# Patient Record
Sex: MALE | Race: BLACK OR AFRICAN AMERICAN | Employment: UNEMPLOYED | URBAN - METROPOLITAN AREA
[De-identification: names, ages, dates, MRNs, and addresses within clinical notes are randomized per-mention and may not be internally consistent; named-entity substitution may affect disease eponyms.]

---

## 2020-10-22 ENCOUNTER — HOSPITAL ENCOUNTER (INPATIENT)
Age: 35
LOS: 5 days | Discharge: HOME | DRG: 751 | End: 2020-10-27
Attending: PSYCHIATRY & NEUROLOGY | Admitting: PSYCHIATRY & NEUROLOGY
Payer: COMMERCIAL

## 2020-10-22 ENCOUNTER — HOSPITAL ENCOUNTER (EMERGENCY)
Age: 35
Discharge: OTHER INSTITUTION | End: 2020-10-22
Attending: STUDENT IN AN ORGANIZED HEALTH CARE EDUCATION/TRAINING PROGRAM | Admitting: STUDENT IN AN ORGANIZED HEALTH CARE EDUCATION/TRAINING PROGRAM
Payer: COMMERCIAL

## 2020-10-22 VITALS
DIASTOLIC BLOOD PRESSURE: 99 MMHG | HEIGHT: 72 IN | BODY MASS INDEX: 20.32 KG/M2 | RESPIRATION RATE: 16 BRPM | SYSTOLIC BLOOD PRESSURE: 142 MMHG | HEART RATE: 72 BPM | WEIGHT: 150 LBS | OXYGEN SATURATION: 99 % | TEMPERATURE: 97.9 F

## 2020-10-22 DIAGNOSIS — R45.851 SUICIDAL IDEATION: Primary | ICD-10-CM

## 2020-10-22 PROBLEM — F19.94 SUBSTANCE INDUCED MOOD DISORDER (HCC): Status: ACTIVE | Noted: 2020-10-22

## 2020-10-22 LAB
ALBUMIN SERPL-MCNC: 4.2 G/DL (ref 3.5–5)
ALBUMIN/GLOB SERPL: 1.2 {RATIO} (ref 1.1–2.2)
ALP SERPL-CCNC: 102 U/L (ref 45–117)
ALT SERPL-CCNC: 61 U/L (ref 12–78)
AMPHET UR QL SCN: POSITIVE
ANION GAP SERPL CALC-SCNC: 5 MMOL/L (ref 5–15)
APPEARANCE UR: CLEAR
AST SERPL-CCNC: 24 U/L (ref 15–37)
B PERT DNA SPEC QL NAA+PROBE: NOT DETECTED
BACTERIA URNS QL MICRO: NEGATIVE /HPF
BACTERIA URNS QL MICRO: NEGATIVE /HPF
BARBITURATES UR QL SCN: NEGATIVE
BASOPHILS # BLD: 0.1 K/UL (ref 0–0.1)
BASOPHILS NFR BLD: 1 % (ref 0–1)
BENZODIAZ UR QL: NEGATIVE
BILIRUB SERPL-MCNC: 0.6 MG/DL (ref 0.2–1)
BILIRUB UR QL: NEGATIVE
BORDETELLA PARAPERTUSSIS PCR, BORPAR: NOT DETECTED
BUN SERPL-MCNC: 14 MG/DL (ref 6–20)
BUN/CREAT SERPL: 11 (ref 12–20)
C PNEUM DNA SPEC QL NAA+PROBE: NOT DETECTED
CALCIUM SERPL-MCNC: 9.5 MG/DL (ref 8.5–10.1)
CANNABINOIDS UR QL SCN: NEGATIVE
CHLORIDE SERPL-SCNC: 105 MMOL/L (ref 97–108)
CO2 SERPL-SCNC: 29 MMOL/L (ref 21–32)
COCAINE UR QL SCN: NEGATIVE
COLOR UR: ABNORMAL
COMMENT, HOLDF: NORMAL
COVID-19 RAPID TEST, COVR: NOT DETECTED
CREAT SERPL-MCNC: 1.32 MG/DL (ref 0.7–1.3)
DIFFERENTIAL METHOD BLD: NORMAL
DRUG SCRN COMMENT,DRGCM: ABNORMAL
EOSINOPHIL # BLD: 0.2 K/UL (ref 0–0.4)
EOSINOPHIL NFR BLD: 2 % (ref 0–7)
EPITH CASTS URNS QL MICRO: ABNORMAL /LPF
EPITH CASTS URNS QL MICRO: NORMAL /LPF
ERYTHROCYTE [DISTWIDTH] IN BLOOD BY AUTOMATED COUNT: 12.8 % (ref 11.5–14.5)
ETHANOL SERPL-MCNC: <10 MG/DL
FLUAV H1 2009 PAND RNA SPEC QL NAA+PROBE: NOT DETECTED
FLUAV H1 RNA SPEC QL NAA+PROBE: NOT DETECTED
FLUAV H3 RNA SPEC QL NAA+PROBE: NOT DETECTED
FLUAV SUBTYP SPEC NAA+PROBE: NOT DETECTED
FLUBV RNA SPEC QL NAA+PROBE: NOT DETECTED
GLOBULIN SER CALC-MCNC: 3.6 G/DL (ref 2–4)
GLUCOSE SERPL-MCNC: 81 MG/DL (ref 65–100)
GLUCOSE UR STRIP.AUTO-MCNC: NEGATIVE MG/DL
HADV DNA SPEC QL NAA+PROBE: NOT DETECTED
HCOV 229E RNA SPEC QL NAA+PROBE: NOT DETECTED
HCOV HKU1 RNA SPEC QL NAA+PROBE: NOT DETECTED
HCOV NL63 RNA SPEC QL NAA+PROBE: NOT DETECTED
HCOV OC43 RNA SPEC QL NAA+PROBE: NOT DETECTED
HCT VFR BLD AUTO: 46.6 % (ref 36.6–50.3)
HEALTH STATUS, XMCV2T: NORMAL
HGB BLD-MCNC: 15.3 G/DL (ref 12.1–17)
HGB UR QL STRIP: NEGATIVE
HMPV RNA SPEC QL NAA+PROBE: NOT DETECTED
HPIV1 RNA SPEC QL NAA+PROBE: NOT DETECTED
HPIV2 RNA SPEC QL NAA+PROBE: NOT DETECTED
HPIV3 RNA SPEC QL NAA+PROBE: NOT DETECTED
HPIV4 RNA SPEC QL NAA+PROBE: NOT DETECTED
HYALINE CASTS URNS QL MICRO: ABNORMAL /LPF (ref 0–5)
HYALINE CASTS URNS QL MICRO: NORMAL /LPF (ref 0–5)
IMM GRANULOCYTES # BLD AUTO: 0 K/UL (ref 0–0.04)
IMM GRANULOCYTES NFR BLD AUTO: 0 % (ref 0–0.5)
KETONES UR QL STRIP.AUTO: NEGATIVE MG/DL
LEUKOCYTE ESTERASE UR QL STRIP.AUTO: NEGATIVE
LYMPHOCYTES # BLD: 2.6 K/UL (ref 0.8–3.5)
LYMPHOCYTES NFR BLD: 38 % (ref 12–49)
M PNEUMO DNA SPEC QL NAA+PROBE: NOT DETECTED
MCH RBC QN AUTO: 27.9 PG (ref 26–34)
MCHC RBC AUTO-ENTMCNC: 32.8 G/DL (ref 30–36.5)
MCV RBC AUTO: 85 FL (ref 80–99)
METHADONE UR QL: NEGATIVE
MONOCYTES # BLD: 0.8 K/UL (ref 0–1)
MONOCYTES NFR BLD: 11 % (ref 5–13)
NEUTS SEG # BLD: 3.3 K/UL (ref 1.8–8)
NEUTS SEG NFR BLD: 48 % (ref 32–75)
NITRITE UR QL STRIP.AUTO: NEGATIVE
NRBC # BLD: 0 K/UL (ref 0–0.01)
NRBC BLD-RTO: 0 PER 100 WBC
OPIATES UR QL: NEGATIVE
PCP UR QL: NEGATIVE
PH UR STRIP: 6 [PH] (ref 5–8)
PLATELET # BLD AUTO: 323 K/UL (ref 150–400)
PMV BLD AUTO: 9.2 FL (ref 8.9–12.9)
POTASSIUM SERPL-SCNC: 4.4 MMOL/L (ref 3.5–5.1)
PROT SERPL-MCNC: 7.8 G/DL (ref 6.4–8.2)
PROT UR STRIP-MCNC: 100 MG/DL
RBC # BLD AUTO: 5.48 M/UL (ref 4.1–5.7)
RBC #/AREA URNS HPF: ABNORMAL /HPF (ref 0–5)
RBC #/AREA URNS HPF: NORMAL /HPF (ref 0–5)
RSV RNA SPEC QL NAA+PROBE: NOT DETECTED
RV+EV RNA SPEC QL NAA+PROBE: DETECTED
SAMPLES BEING HELD,HOLD: NORMAL
SODIUM SERPL-SCNC: 139 MMOL/L (ref 136–145)
SOURCE, COVRS: NORMAL
SP GR UR REFRACTOMETRY: 1.03 (ref 1–1.03)
SPECIMEN SOURCE, FCOV2M: NORMAL
SPECIMEN TYPE, XMCV1T: NORMAL
UROBILINOGEN UR QL STRIP.AUTO: 1 EU/DL (ref 0.2–1)
WBC # BLD AUTO: 6.9 K/UL (ref 4.1–11.1)
WBC URNS QL MICRO: ABNORMAL /HPF (ref 0–4)
WBC URNS QL MICRO: NORMAL /HPF (ref 0–4)

## 2020-10-22 PROCEDURE — 99284 EMERGENCY DEPT VISIT MOD MDM: CPT

## 2020-10-22 PROCEDURE — 90791 PSYCH DIAGNOSTIC EVALUATION: CPT

## 2020-10-22 PROCEDURE — 0100U RESPIRATORY PANEL,PCR,NASOPHARYNGEAL: CPT

## 2020-10-22 PROCEDURE — 81001 URINALYSIS AUTO W/SCOPE: CPT

## 2020-10-22 PROCEDURE — 85025 COMPLETE CBC W/AUTO DIFF WBC: CPT

## 2020-10-22 PROCEDURE — 80307 DRUG TEST PRSMV CHEM ANLYZR: CPT

## 2020-10-22 PROCEDURE — 80053 COMPREHEN METABOLIC PANEL: CPT

## 2020-10-22 PROCEDURE — 36415 COLL VENOUS BLD VENIPUNCTURE: CPT

## 2020-10-22 PROCEDURE — 65220000003 HC RM SEMIPRIVATE PSYCH

## 2020-10-22 PROCEDURE — 87635 SARS-COV-2 COVID-19 AMP PRB: CPT

## 2020-10-22 RX ORDER — OLANZAPINE 5 MG/1
5 TABLET ORAL
Status: DISCONTINUED | OUTPATIENT
Start: 2020-10-22 | End: 2020-10-27 | Stop reason: HOSPADM

## 2020-10-22 RX ORDER — LORAZEPAM 2 MG/ML
1 INJECTION INTRAMUSCULAR
Status: DISCONTINUED | OUTPATIENT
Start: 2020-10-22 | End: 2020-10-27 | Stop reason: HOSPADM

## 2020-10-22 RX ORDER — ACETAMINOPHEN 325 MG/1
650 TABLET ORAL
Status: DISCONTINUED | OUTPATIENT
Start: 2020-10-22 | End: 2020-10-27 | Stop reason: HOSPADM

## 2020-10-22 RX ORDER — TRAZODONE HYDROCHLORIDE 50 MG/1
50 TABLET ORAL
Status: DISCONTINUED | OUTPATIENT
Start: 2020-10-22 | End: 2020-10-27 | Stop reason: HOSPADM

## 2020-10-22 RX ORDER — HALOPERIDOL 5 MG/ML
5 INJECTION INTRAMUSCULAR
Status: DISCONTINUED | OUTPATIENT
Start: 2020-10-22 | End: 2020-10-27 | Stop reason: HOSPADM

## 2020-10-22 RX ORDER — GUAIFENESIN 100 MG/5ML
100 SOLUTION ORAL
Status: DISCONTINUED | OUTPATIENT
Start: 2020-10-22 | End: 2020-10-27 | Stop reason: HOSPADM

## 2020-10-22 RX ORDER — DM/P-EPHED/ACETAMINOPH/DOXYLAM 30-7.5/3
2 LIQUID (ML) ORAL
Status: DISCONTINUED | OUTPATIENT
Start: 2020-10-22 | End: 2020-10-27 | Stop reason: HOSPADM

## 2020-10-22 RX ORDER — ADHESIVE BANDAGE
30 BANDAGE TOPICAL DAILY PRN
Status: DISCONTINUED | OUTPATIENT
Start: 2020-10-22 | End: 2020-10-27 | Stop reason: HOSPADM

## 2020-10-22 RX ORDER — BENZTROPINE MESYLATE 1 MG/1
1 TABLET ORAL
Status: DISCONTINUED | OUTPATIENT
Start: 2020-10-22 | End: 2020-10-27 | Stop reason: HOSPADM

## 2020-10-22 RX ORDER — HYDROXYZINE 50 MG/1
50 TABLET, FILM COATED ORAL
Status: DISCONTINUED | OUTPATIENT
Start: 2020-10-22 | End: 2020-10-27 | Stop reason: HOSPADM

## 2020-10-22 RX ORDER — DIPHENHYDRAMINE HYDROCHLORIDE 50 MG/ML
50 INJECTION, SOLUTION INTRAMUSCULAR; INTRAVENOUS
Status: DISCONTINUED | OUTPATIENT
Start: 2020-10-22 | End: 2020-10-27 | Stop reason: HOSPADM

## 2020-10-22 NOTE — ED NOTES
[facility] arrived to transport patient to [address] [place] [address] Rd; pt stable at time of transfer, all patient belongings sent with [facility]; EMTALA, PCS, facesheet and ED summary given to [facility].

## 2020-10-22 NOTE — ED TRIAGE NOTES
Pt here feelings of depression. States he is \"going through a lot right now\". Pt denies SI. Unable to give timeline of when feelings started. Pt states \"I'm just not thinking right\". Pt also states, \"there is something going on with my skin. I don't know, lice maybe? It's not going on right now but I wanted to get that checked out\". Denies rash.

## 2020-10-22 NOTE — ED NOTES
For patient safety, pt changed into green gown. All personal belongings put into belongings bag. Room checked for safety. Pt's still low risk for SI screening. MD aware and feels pt does not need a sitter at this time. Pt continues to be cooperative.

## 2020-10-22 NOTE — ED NOTES
Pt admitted to having a knife in his pocket. Pt offered to give his knife to his mother to take to the car. Pt is being upfront and cooperative. MD and  made aware. Pt denies thoughts or plans of SI/HI at this time. Family member removed weapon from the facility.

## 2020-10-22 NOTE — ED PROVIDER NOTES
The patient is a 29-year-old male history of substance abuse presenting to the emergency department today for behavioral health assessment. Patient tells me that he has been feeling down for the past 1 year or so. He states that [place] lot of things have happened\" recently and has had worsening depression without suicidal or homicidal thoughts. He reports using drugs including methamphetamines, states that he last used 2 to 3 weeks ago however his mom told nurse that he used 2 days ago. Patient denies having any psychiatric diagnoses nor has he ever been admitted for this. He states he recently got in trouble with the law and this is also exacerbated things. Patient's mother told RN that he has expressed suicidal thoughts at home and was recently running a knife up and down his arm. She reports that she does not feel safe with him at home as he punches walls although he is never hurt her. A knife was found on the patient by RN when undressing him. Patient also tells me that he thinks he has worms and/or parasites coming out of his skin. This has been going on for several weeks. He states that he has itching and he will pour peroxide on his skin and see small bugs coming out of his skin. States he also has been scrubbing his skin constantly and shaved all the hair off of his body. Social History     Socioeconomic History    Marital status: SINGLE     Spouse name: Not on file    Number of children: Not on file    Years of education: Not on file    Highest education level: Not on file   Occupational History    Not on file   Social Needs    Financial resource strain: Not on file    Food insecurity     Worry: Not on file     Inability: Not on file    Transportation needs     Medical: Not on file     Non-medical: Not on file   Tobacco Use    Smoking status: Current Every Day Smoker   Substance and Sexual Activity    Alcohol use:  Yes    Drug use: No    Sexual activity: Not on file Lifestyle    Physical activity     Days per week: Not on file     Minutes per session: Not on file    Stress: Not on file   Relationships    Social connections     Talks on phone: Not on file     Gets together: Not on file     Attends Gnosticism service: Not on file     Active member of club or organization: Not on file     Attends meetings of clubs or organizations: Not on file     Relationship status: Not on file    Intimate partner violence     Fear of current or ex partner: Not on file     Emotionally abused: Not on file     Physically abused: Not on file     Forced sexual activity: Not on file   Other Topics Concern    Not on file   Social History Narrative    Not on file         ALLERGIES: Patient has no known allergies. Review of Systems   Constitutional: Negative for chills and fever. HENT: Negative for congestion and sore throat. Eyes: Negative for pain and redness. Respiratory: Negative for cough and shortness of breath. Cardiovascular: Negative for chest pain and palpitations. Gastrointestinal: Negative for abdominal pain, diarrhea, nausea and vomiting. Genitourinary: Negative for frequency and hematuria. Musculoskeletal: Negative for back pain and neck pain. Skin: Negative for rash and wound. Neurological: Negative for dizziness and headaches. Hematological: Does not bruise/bleed easily. Psychiatric/Behavioral: Positive for dysphoric mood. Vitals:    10/22/20 1152   BP: 119/79   Pulse: (!) 105   Resp: 20   Temp: 97.8 °F (36.6 °C)   SpO2: 98%   Weight: 68 kg (150 lb)   Height: 6' (1.829 m)            Physical Exam  Vitals signs and nursing note reviewed. Constitutional:       General: He is not in acute distress. Appearance: He is well-developed. HENT:      Head: Normocephalic and atraumatic. Eyes:      Conjunctiva/sclera: Conjunctivae normal.      Pupils: Pupils are equal, round, and reactive to light.    Neck:      Musculoskeletal: Normal range of motion and neck supple. Cardiovascular:      Rate and Rhythm: Normal rate and regular rhythm. Heart sounds: Normal heart sounds. No murmur. No friction rub. No gallop. Pulmonary:      Effort: Pulmonary effort is normal. No respiratory distress. Breath sounds: Normal breath sounds. No wheezing or rales. Abdominal:      General: Bowel sounds are normal. There is no distension. Palpations: Abdomen is soft. Tenderness: There is no abdominal tenderness. There is no guarding or rebound. Musculoskeletal: Normal range of motion. Skin:     General: Skin is warm and dry. Capillary Refill: Capillary refill takes less than 2 seconds. Findings: No rash. Comments: No visible rash or infestation on his skin   Neurological:      Mental Status: He is alert and oriented to person, place, and time. Psychiatric:      Comments: Cooperative with flat affect  Denies active suicidal or homicidal ideation  Does not appear internally stimulated at this time        Labs Reviewed:   UA negative  UDS positive for amphetamines  No leukocytosis or anemia  Normal renal function and electrolytes  Covid negative    Imaging Reviewed: N/A      Course:   [facility] saw the patient. Plan is to admit. Patient agreeable to this. He is medically cleared        MDM:  78-year-old male with no diagnosed psychiatric history presenting today with depression and suicidal ideation in the setting of recent drug use. He also is concerned that he has worms coming from his skin, which I do not appreciate, is unclear if this is a hallucination or not. Patient is medically cleared and will be admitted to [facility] psychiatric inpatient unit. Clinical Impression:     ICD-10-CM ICD-9-CM    1. Suicidal ideation  R45. 1 V62.84            Disposition: [address] [place] [address],

## 2020-10-22 NOTE — BH NOTES
Pt arrived at [facility] via [facility] on stretcher  Dual skin assesment was performed by Vivek Garcia, [facility] [address] [facility]. Assessment was unremarkable, tattoos on right arm . Nolberto score is 23  Pt did consent to safety while on the unit   Did sign consent forms up on arriving   Denies suicidal ideation   Denies homicidal ideation   Does not appear to be responding to internal stimuli   Did insert plastic into ear and told staff \"he needed de-wormer because worms have been coming our of his ear\"   UDS was positive for amphetamines    Pt was calm and cooporative during admission process.  Will continue to monitor and support q15min

## 2020-10-22 NOTE — BSMART NOTE
This note will not be viewable in MyChart for the following reason(s). Likely risk of substantial harm from patient finding out mother disclosed information that he was not forthcoming about, including that she is afraid of him. Comprehensive Assessment Form Part 1 Section I - Disposition Axis I - Substance Induced Psychotic Disorder, Unspecified Depression Axis II - Deferred Axis III - None Axis IV - Relationship stressors, Legal stressors Ashley V - 36 The Medical Doctor to Psychiatrist conference was not completed. The Medical Doctor is in agreement with Psychiatrist disposition because of (reason) patient is willing to be admitted voluntarily. The plan is admit to [facility] acute [facility]. The on-call Psychiatrist consulted was DEVORAH Pacheco. The admitting Psychiatrist will be Dr. Guero Mares. The admitting Diagnosis is Substance Induced Psychotic Disorder. The Payor source is self. Section II - Integrated Summary Summary:  Patient is a 36year old male seen via telepsych. He was brought to the ER by his mother. He reported he is depressed and \"going through a lot. \"  He said he is not thinking right and feels \"at the end of my road. \"  He also said it \"feels like I have no purpose. \"  He denied any history of mental health treatment. He denied suicidal or homicidal ideation. When asked if he has been hearing or seeing things other people cannot, he said \"I'm not sure. \"  He told ER staff he thinks he has bugs or parasites are coming out of his skin. He apparently has shaved off most of his body hair and is taking baths in peroxide in an attempt to rid himself of the bugs. ER staff reported seeing zero bugs. He reported he is not sleeping, as he worries a lot. He denied a support system other than his mother. He admitted to being paranoid. Patient has been using methamphetamine for about a year.   He stated he has no idea when he last used.  He told nursing staff it was 2 weeks ago. His mother reported it has been 2 days. His mother also reported that he has been talking about suicide at home and walking around the house running a knife up and down his arms. She also reported she is fearful of him because he has been punching the walls. He has not harmed her or her mother who lives with them. Patient reported he was recently arrested on drug charges. He was unclear what exactly the charges are and seemed genuinely confused. He is willing to be admitted voluntarily. The patient has demonstrated mental capacity to provide informed consent. The information is given by the patient and mother. The Chief Complaint is mental health problem. The Precipitant Factors are relationship stressors, legal stressors, substance abuse. Previous Hospitalizations: no The patient has not previously been in restraints. Current Psychiatrist and/or  is NA. Lethality Assessment: 
 
The potential for suicide is noted by the following: ideation and current substance abuse. The potential for homicide is not noted. The patient has not been a perpetrator of sexual or physical abuse. There are not pending charges. The patient is felt to be at risk for self harm or harm to others. The attending nurse was advised the patient needs supervision. Section III - Psychosocial 
The patient's overall mood and attitude is withdrawn. Feelings of helplessness and hopelessness are observed by patient's self report. Generalized anxiety is not observed. Panic is not observed. Phobias are not observed. Obsessive compulsive tendencies are not observed. Section IV - Mental Status Exam 
The patient's appearance shows no evidence of impairment. The patient's behavior shows no evidence of impairment. The patient is oriented to time, place, person and situation.   The patient's speech shows no evidence of impairment. The patient's mood is withdrawn. The range of affect is flat. The patient's thought content demonstrates paranoia. The thought process shows no evidence of impairment. The patient's perception demonstrated changes in the following:  visual and tactile hallucinations. The patient's memory shows no evidence of impairment. The patient's appetite shows no evidence of impairment. The patient's sleep has evidence of insomnia. The patient's insight is blaming. The patient's judgement is psychologically impaired. Section V - Substance Abuse The patient is using substances. The patient is using methamphetamine orally, by inhalation, or by smoking for 1 year with last use on unknown. The patient has experienced the following withdrawal symptoms: N/A. Section VI - Living Arrangements The patient is single. The patient lives with his mother and grandmother. The patient has one child age 10. The patient does plan to return home upon discharge. The patient does have legal issues pending. The patient's source of income comes from family. Baptism and cultural practices have not been voiced at this time. The patient's greatest support comes from his mother and this person will be involved with the treatment. The patient has not been in an event described as horrible or outside the realm of ordinary life experience either currently or in the past. 
The patient has not been a victim of sexual/physical abuse. Section VII - Other Areas of Clinical Concern The highest grade achieved is not assessed with the overall quality of school experience being described as unknown. The patient is currently unemployed and speaks [place] as a primary language. The patient has no communication impairments affecting communication. The patient's preference for learning can be described as: can read and write adequately.   The patient's hearing is normal.  The patient's vision is normal. 
 
 
 Adelina Still MA

## 2020-10-23 LAB — TSH SERPL DL<=0.05 MIU/L-ACNC: 0.42 UIU/ML (ref 0.36–3.74)

## 2020-10-23 PROCEDURE — 36415 COLL VENOUS BLD VENIPUNCTURE: CPT

## 2020-10-23 PROCEDURE — 65220000003 HC RM SEMIPRIVATE PSYCH

## 2020-10-23 PROCEDURE — 84443 ASSAY THYROID STIM HORMONE: CPT

## 2020-10-23 PROCEDURE — 74011250637 HC RX REV CODE- 250/637: Performed by: PSYCHIATRY & NEUROLOGY

## 2020-10-23 RX ORDER — RISPERIDONE 1 MG/1
2 TABLET, FILM COATED ORAL
Status: DISCONTINUED | OUTPATIENT
Start: 2020-10-23 | End: 2020-10-27 | Stop reason: HOSPADM

## 2020-10-23 RX ADMIN — RISPERIDONE 2 MG: 1 TABLET ORAL at 21:42

## 2020-10-23 NOTE — INTERDISCIPLINARY ROUNDS
Behavioral Health Interdisciplinary Rounds Patient Name: Penny Cazares  Age: 36 y.o. Room/Bed:  730/02 Primary Diagnosis: <principal problem not specified> Admission Status: Voluntary Readmission within 30 days: no 
Power of  in place: no 
Patient requires a blocked bed: no          Reason for blocked bed: VTE Prophylaxis: No 
 
Mobility needs/Fall risk: no 
Flu Vaccine : no  
Nutritional Plan: no 
Consults:         
Labs/Testing due today?: yes Sleep hours:  8 Participation in Care/Groups:  New admit Medication Compliant?: New admit PRNS (last 24 hours): None Restraints (last 24 hours):  no 
  
CIWA (range last 24 hours): COWS (range last 24 hours): Alcohol screening (AUDIT) completed -   AUDIT Score: 3 If applicable, date SBIRT discussed in treatment team AND documented:  
AUDIT Screen Score: AUDIT Score: 3 Tobacco - patient is a smoker: Have You Used Tobacco in the Past 30 Days: Yes Illegal Drugs use: Have You Used Any Illegal Substances Over the Past 12 Months: Yes 
 
24 hour chart check complete: yes Patient goal(s) for today:  
Treatment team focus/goals: Plan to assess for medications and discharge needs. Progress note : He stated he had been more paranoid and guarded. Using meth daily - willing to start medications LOS:  1  Expected LOS: TBD Financial concerns/prescription coverage: medicaid Family contact: mother - Patient signed a TEE for his motherMsMorris Booker Johna - [phone] GLORIA spoke to his mother - She is very supportive of treatment - Patient is involved in [facility] and they have recommend a 30 day treatment program in [address] [place] [address] [phone]     [phone]     [phone] fax Family requesting physician contact today:   
Discharge plan:he will return home with his mother / may go to a 28 day program  
Access to weapons :  no    
 Outpatient provider(s): TBD Patient's preferred phone number for follow up call :  
 
Participating treatment team members: Lynda Nicole - Dr. Alex Montgomery

## 2020-10-23 NOTE — CONSULTS
Hospitalist History and Physical    Date of Service: 10/22/2020  Primary Care Provider: None  Source of Information: Patient, chart review    History of Presenting Illness:   Re Sierra is a 36 y.o. male with no past medical history and no documented psychiatric history admitted from [address] [place] [address] after presenting there with complaints of \"feeling down\" during the past year. He denied SI and HI and did admit to using drugs including methamphetamines. Per the ED providers H&P, patient's mother reported that patient has expressed suicidal thoughts and was recently \"running a knife up and down his arm\". A knife was found on the patient while he was being undressed in the ED. Patient also reported seeing worms/bugs coming out of his skin for the past 4 weeks and has been scrubbing constantly in response. UDS in the ED positive for amphetamines but was otherwise normal.  Covid test was negative. Continues to deny HI and SI;  States that the \"bugs or parasites\" have continued to crawl on his skin \"or maybe they're on the inside. \" Reports cold s/s(cough and congestion) x one week; Has been taking OTC Robitussin. (FLU A&B negative). Currently takes no medications    The hospitalist group is consulted for medical management. Assessment & Plan     Hallucinations. Parasites/bugs on skin  >exam normal  >eval and tx per primary team    Nicotine Dependence  >refused counseling  >NRT offered and refused    URI s/s, likely Viral  >Robitussin PRN    Thank you for giving us opportunity to participate in this patients care. Will sign off at this time, please re-consult if there are any further medical management needs or questions          DVT Prophylaxis: Up ad jean, none indicated  Code status: Full  Disposition: Per primary team     Review of Systems:  Review of Systems   Constitutional: Negative for chills, fever, malaise/fatigue and weight loss. HENT: Negative. Eyes: Negative.     Respiratory: Positive for cough and sputum production. Negative for hemoptysis, shortness of breath and wheezing. Cardiovascular: Negative for chest pain, palpitations, orthopnea and claudication. Gastrointestinal: Negative. Genitourinary: Negative. Musculoskeletal: Negative. Skin: Positive for itching. Feels \"bugs or parasites\" on skin   Neurological: Negative. Endo/Heme/Allergies: Negative. Psychiatric/Behavioral: Positive for hallucinations. Past Medical History:   Diagnosis Date    Substance abuse ([facility] [address]       History reviewed. No pertinent surgical history. Prior to Admission medications    Medication Sig Start Date End Date Taking? Authorizing Provider   amoxicillin 500 mg Tab Take 500 mg by mouth three (3) times daily. 11/6/12   Gena Last, DO   HYDROcodone-acetaminophen (VICODIN) 5-500 mg per tablet Take 1 Tab by mouth every six (6) hours as needed for Pain. 11/6/12   Gena Last, DO     No Known Allergies   History reviewed. No pertinent family history. SOCIAL HISTORY:    FUNCTIONAL STATUS PRIOR TO ADMISSION: Ambulates Independently    Patient resides: With Family    Smoking history:   Some Day Smoker     Drugs:  Amphetamines  Alcohol history:  Socially     Ambulates:  Independently       Work History:    Landscaping      CODE STATUS:  Full         Objective:     Physical Exam:   General:  Alert, cooperative, no distress, appears stated age. Head:  Normocephalic, without obvious abnormality, atraumatic. Eyes:  Conjunctivae/corneas clear. PERRL, EOMs intact. Nose: Nares normal. Septum midline. Mucosa normal. No drainage or sinus tenderness. Throat: Lips, mucosa, and tongue normal. Teeth and gums normal.   Neck: Supple, symmetrical, trachea midline, no adenopathy, thyroid: no enlargement/tenderness/nodules, no carotid bruit and no JVD. Back:   Symmetric, no curvature. ROM normal. No CVA tenderness. Lungs:   Clear to auscultation bilaterally.    Chest wall: No tenderness or deformity. Heart:  Regular rate and tachy, S1, S2 normal, no murmur, click, rub or gallop. Abdomen:   Soft, obese, non-tender. Bowel sounds normal. No masses,  No organomegaly. Extremities: Extremities normal, atraumatic, no cyanosis or edema. Pulses: 2+ and symmetric all extremities. Skin: Skin color, texture, turgor normal. No rashes or lesions. No parasites, worms or bites or sores. Neurologic: CNII-XII intact.  No focal weakness        Visit Vitals  BP (!) 144/85 (BP 1 Location: Right arm, BP Patient Position: Sitting)   Pulse 80   Temp 98.2 °F (36.8 °C)   Resp 16   Ht 6' (1.829 m)   Wt 68 kg (150 lb)   SpO2 98%   BMI 20.34 kg/m²      O2 Device: Room air      Data Review:   Lab results (past 7 days)  Admission on 10/22/2020, Discharged on 10/22/2020   Component Date Value    Color 10/22/2020 YELLOW/STRAW     Appearance 10/22/2020 CLEAR     Specific gravity 10/22/2020 1.030     pH (UA) 10/22/2020 6.0     Protein 10/22/2020 100*    Glucose 10/22/2020 Negative     Ketone 10/22/2020 Negative     Bilirubin 10/22/2020 Negative     Blood 10/22/2020 Negative     Urobilinogen 10/22/2020 1.0     Nitrites 10/22/2020 Negative     Leukocyte Esterase 10/22/2020 Negative     WBC 10/22/2020 0-4     RBC 10/22/2020 0-5     Epithelial cells 10/22/2020 FEW     Bacteria 10/22/2020 Negative     Hyaline cast 10/22/2020 2-5     AMPHETAMINES 10/22/2020 Positive*    BARBITURATES 10/22/2020 Negative     BENZODIAZEPINES 10/22/2020 Negative     COCAINE 10/22/2020 Negative     METHADONE 10/22/2020 Negative     OPIATES 10/22/2020 Negative     PCP(PHENCYCLIDINE) 10/22/2020 Negative     THC (TH-CANNABINOL) 10/22/2020 Negative     Drug screen comment 10/22/2020 (NOTE)     WBC 10/22/2020 6.9     RBC 10/22/2020 5.48     HGB 10/22/2020 15.3     HCT 10/22/2020 46.6     MCV 10/22/2020 85.0     MCH 10/22/2020 27.9     MCHC 10/22/2020 32.8     RDW 10/22/2020 12.8     PLATELET 39/12/2143 323     MPV 10/22/2020 9.2     NRBC 10/22/2020 0.0     ABSOLUTE NRBC 10/22/2020 0.00     NEUTROPHILS 10/22/2020 48     LYMPHOCYTES 10/22/2020 38     MONOCYTES 10/22/2020 11     EOSINOPHILS 10/22/2020 2     BASOPHILS 10/22/2020 1     IMMATURE GRANULOCYTES 10/22/2020 0     ABS. NEUTROPHILS 10/22/2020 3.3     ABS. LYMPHOCYTES 10/22/2020 2.6     ABS. MONOCYTES 10/22/2020 0.8     ABS. EOSINOPHILS 10/22/2020 0.2     ABS. BASOPHILS 10/22/2020 0.1     ABS. IMM. GRANS. 10/22/2020 0.0     DF 10/22/2020 AUTOMATED     SAMPLES BEING HELD 10/22/2020 1SST,1BLUE     COMMENT 10/22/2020 Add-on orders for these samples will be processed based on acceptable specimen integrity and analyte stability, which may vary by analyte.  Sodium 10/22/2020 139     Potassium 10/22/2020 4.4     Chloride 10/22/2020 105     CO2 10/22/2020 29     Anion gap 10/22/2020 5     Glucose 10/22/2020 81     BUN 10/22/2020 14     Creatinine 10/22/2020 1.32*    BUN/Creatinine ratio 10/22/2020 11*    GFR est AA 10/22/2020 >60     GFR est non-AA 10/22/2020 >60     Calcium 10/22/2020 9.5     Bilirubin, total 10/22/2020 0.6     ALT (SGPT) 10/22/2020 61     AST (SGOT) 10/22/2020 24     Alk. phosphatase 10/22/2020 102     Protein, total 10/22/2020 7.8     Albumin 10/22/2020 4.2     Globulin 10/22/2020 3.6     A-G Ratio 10/22/2020 1.2     ALCOHOL(ETHYL),SERUM 10/22/2020 <10     Specimen source 10/22/2020 Nasopharyngeal     Specimen source 10/22/2020 Nasopharyngeal     COVID-19 rapid test 10/22/2020 Not detected     Specimen type 10/22/2020 NP Swab     Health status 10/22/2020 Symptomatic Testing     WBC 10/22/2020 0-4     RBC 10/22/2020 0-5     Epithelial cells 10/22/2020 FEW     Bacteria 10/22/2020 Negative     Hyaline cast 10/22/2020 0-2         Imaging results (past 24 hours):  No results found. EKG (most recent):   No results found for this or any previous visit.     Signed By: Natacha Casanova NP     October 22, 2020       [address], NP-C  [phone]  and via [employer]

## 2020-10-23 NOTE — PROGRESS NOTES
Problem: Discharge Planning  Goal: *Discharge to safe environment  Outcome: Progressing Towards Goal  Note: Safe housing   Supportive mother   Goal: *Knowledge of medication management  Outcome: Progressing Towards Goal  Note: Willing to take medications   Goal: *Knowledge of discharge instructions  Outcome: Progressing Towards Goal  Note: He is able to verbalize discharge instructions

## 2020-10-23 NOTE — PROGRESS NOTES
Problem: Altered Thought Process (Adult/Pediatric)  Goal: *STG: Remains safe in hospital  Outcome: Progressing Towards Goal     Problem: Falls - Risk of  Goal: *Absence of Falls  Description: Document Adriana Fall Risk and appropriate interventions in the flowsheet. Outcome: Progressing Towards Goal  Note: Fall Risk Interventions:     Medication Interventions: Patient to call before getting OOB    1500 Rec'd patient today awake in bed.  Pt greeted nurse and tech, made good eye contact and pleasant

## 2020-10-23 NOTE — BH NOTES
Mother called request Social work fax paper to [place]  Regarding pt current status admission inpatient   Fax [phone]  Phone [phone]

## 2020-10-23 NOTE — H&P
[address] [facility] HISTORY AND PHYSICAL    Name:  Silviano Appiah  MR#:  [id number]  :  1985  ACCOUNT #:  [de-identified]  ADMIT DATE:  10/22/2020    INITIAL PSYCHIATRIC INTERVIEW    CHIEF COMPLAINT:  \"I was not feeling too well. \"    HISTORY OF PRESENT ILLNESS:  The patient is a 73[place] who is currently admitted to our psychiatric facility after he was transferred to us from the ER at [facility].  He had been brought there by his mother with a history of having expressed thoughts of suicide. She had also been concerned because his behavior had changed including being more paranoid, walking around the house with a knife and staying up all hours of the night thinking somebody was going to come into the home. He had also expressed the belief that he was infested by parasites which started about a week ago, according to the patient. He has reportedly been shaving all of the hair of his body because he thinks he can see the worms and says that he brought one worm into the ER with him but nobody believed him. He states that he started using meth about a year ago and uses it almost every day. His last use was 2 days ago. Since his meth use, he has been more paranoid, but he does not believe that it contributes to his feeling that he has worms on his skin. He has a history of using other drugs including marijuana, cocaine, and prescription opiates, but has not used them in more than a year. Notes that he is sleeping poorly and has not been able to go to work for the past year because of his mood. He is agreeable to starting medication and has been calm and cooperative since his admission on the floor. Denies any auditory hallucinations.     PAST MEDICAL HISTORY:  Reviewed as per the history and physical exam.      Past Medical History:   Diagnosis Date    Substance abuse ([facility] [address]      Prior to Admission medications    Not on File     Vitals:    10/25/20 0830 10/25/20 1147 10/25/20 1608 10/26/20 0725   BP: 119/81 103/60 115/75 104/66   Pulse: 78 64 82 72   Resp: 16 14 16 14   Temp: 97.3 °F (36.3 °C) 97.9 °F (36.6 °C) 98 °F (36.7 °C) 98.3 °F (36.8 °C)   SpO2: 98% 100% 96% 96%   Weight:  70.2 kg (154 lb 12.8 oz)     Height:         Lab Results   Component Value Date/Time    WBC 6.9 10/22/2020 01:00 PM    HGB 15.3 10/22/2020 01:00 PM    HCT 46.6 10/22/2020 01:00 PM    PLATELET 057 16/03/3324 01:00 PM    MCV 85.0 10/22/2020 01:00 PM     Lab Results   Component Value Date/Time    Sodium 139 10/22/2020 01:00 PM    Potassium 4.4 10/22/2020 01:00 PM    Chloride 105 10/22/2020 01:00 PM    CO2 29 10/22/2020 01:00 PM    Anion gap 5 10/22/2020 01:00 PM    Glucose 81 10/22/2020 01:00 PM    BUN 14 10/22/2020 01:00 PM    Creatinine 1.32 (H) 10/22/2020 01:00 PM    BUN/Creatinine ratio 11 (L) 10/22/2020 01:00 PM    GFR est AA >60 10/22/2020 01:00 PM    GFR est non-AA >60 10/22/2020 01:00 PM    Calcium 9.5 10/22/2020 01:00 PM    Bilirubin, total 0.6 10/22/2020 01:00 PM    Alk. phosphatase 102 10/22/2020 01:00 PM    Protein, total 7.8 10/22/2020 01:00 PM    Albumin 4.2 10/22/2020 01:00 PM    Globulin 3.6 10/22/2020 01:00 PM    A-G Ratio 1.2 10/22/2020 01:00 PM    ALT (SGPT) 61 10/22/2020 01:00 PM    AST (SGOT) 24 10/22/2020 01:00 PM     No results found for: VALF2, VALAC, VALP, VALPR, DS6, CRBAM, CRBAMP, CARB2, XCRBAM  No results found for: LITHM  RADIOLOGY REPORTS:(reviewed/updated 10/26/2020)  No results found. PAST PSYCHIATRIC HISTORY:  The patient reports that when he was in 8th grade, he was feeling very stressed out and was taken to see a counselor by his mother a few times but does not remember the exact details. He thinks that he was not started on any medications. His substance abuse history is noted as above. Denies any prior history of psychiatric hospitalizations or suicide attempts. Denies any delusions.     PSYCHOSOCIAL HISTORY:  The patient currently lives in [place] where he lives with his mother and his grandmother. States that he works for a  and he has known for several years, but has not been able to go to work for the past week. He has never been , but does have a 10year-old daughter who also lives in [place] with the biological mother. He has visitation rights but lost custody and says he gets to see her once or twice in a month. Currently, he is not paying child support for her. Denies any major legal or financial stressors, although there appears to be a history of recent arrest for drug-related charges. MENTAL STATUS EXAMINATION:  The patient is a young [facility] American man who is dressed in hospital apparel. He is calm and cooperative during the interview and makes fair eye contact. Speech is spontaneous and coherent. Psychomotor activity is within normal limits. Mood is reported as little down and affect is flat. Delusion of parasitic infestation is present as described above. Ideas of reference and persecution are also present. Denies any auditory or visual hallucinations. His thought process is logical and goal directed. Cognitively, he is awake and alert, oriented to time, place, and person. Intelligence is average. Memory is intact and fund of knowledge is adequate. Insight is partial.  Judgment is poor. ASSESSMENT AND PLAN/DIAGNOSES:  Unspecified psychosis, rule out substance-induced psychosis, methamphetamine use disorder, severe. At the present time, we will continue his inpatient stay. He will be provided with support and attend groups. Estimated length of stay is 5-7 days. His strengths include his ability to seek help and support from his mother.         MD JAGRUTI Eduardo/[id number]  D:  10/23/2020 12:48  T:  10/23/2020 15:39  JOB #:  [id number]

## 2020-10-23 NOTE — PROGRESS NOTES
Problem: Falls - Risk of  Goal: *Absence of Falls  Description: Document Nissa Coles Fall Risk and appropriate interventions in the flowsheet. Outcome: Progressing Towards Goal  Note: Fall Risk Interventions:            Medication Interventions: Patient to call before getting OOB         Resting in bed with eyes closed, no complaints, no distress noted. Safety measures in place, will continue to monitor.

## 2020-10-23 NOTE — PROGRESS NOTES
Problem: Altered Thought Process (Adult/Pediatric)  Goal: *STG: Participates in treatment plan  Outcome: Progressing Towards Goal     Problem: Altered Thought Process (Adult/Pediatric)  Goal: *STG: Remains safe in hospital  Outcome: Progressing Towards Goal     Problem: Altered Thought Process (Adult/Pediatric)  Goal: *STG: Decreased delusional thinking  Outcome: Not Progressing Towards Goal     Problem: Altered Thought Process (Adult/Pediatric)  Goal: Interventions  Outcome: Progressing Towards Goal  Note: Pt is delusional. Paranoid. Isolative to his room. Did not eat breakfast.    pt verbalizes concern of \"dark\" thoughts throughout his life. History of substance abuse used as coping skill. Pt requests printout of Risperdal new scheduled medication (education given). Tactile and visual hallucinations: bugs, worms. Poor insight.    [address] [facility] 60  Master Treatment Plan for Prudence [place]    Date Treatment Plan Initiated: 10/23/2020  Treatment Plan Modalities:  Type of Modality Amount  (x minutes) Frequency (x/week) Duration (x days) Name of Responsible Staff   Community & wrap-up meetings to encourage peer interactions 15 7 1 Alan TYLER     Group psychotherapy to assist in building coping skills and internal controls 60 7 1 Macho Ortiz   Therapeutic activity groups to build coping skills 60 7 1 Macho Ortiz   Psychoeducation in group setting to address:   Medication education   Kym Parker 42. PharmD   Coping skills   30 3 1 Macho Ortiz   Relaxation techniques         Symptom management         Discharge planning   60 2 255 [facility]    60 2 1 Chaplain HITCHCOCK   60 1 1 volunteer   Recovery/AA/NA      volunteer   Physician medication management   15 7 1 Dr Lorrene Bumpers NP   Family meeting/discharge planning   15 2 1400 [facility] [place]

## 2020-10-23 NOTE — PROGRESS NOTES
Admission Medication Reconciliation:    Information obtained from:  Patient interview  RxQuery data available¹:  NO    Comments/Recommendations: Updated PTA meds/reviewed patient's allergies. 1)  Patient denies taking any medications prior to admission. 2)  Medication changes (since last review): none   ¹RxQuery pharmacy benefit data reflects medications filled and processed through the patient's insurance, however   this data does NOT capture whether the medication was picked up or is currently being taken by the patient. Allergies:  Patient has no known allergies. Significant PMH/Disease States:   Past Medical History:   Diagnosis Date    Substance abuse [facility])      Chief Complaint for this Admission:  No chief complaint on file.     Prior to Admission Medications:   None     JEANNETTE Burnette

## 2020-10-23 NOTE — BH NOTES
PSYCHOSOCIAL ASSESSMENT  :Patient identifying info:  Dennis Milian is a 36 y.o., male admitted 10/22/2020  7:40 PM     Presenting problem and precipitating factors: He was brought to the Er by his mother due to increased depression, paranoia and methamphetamine use. Mental status assessment:alert , oriented,  pleasant, he was guarded,   Insight and judgement are impaired     Strengths: safe housing - willingness to seek treatment     Collateral information: mother - [phone] - he signed a TEE     Current psychiatric /substance abuse providers and contact info: no current providers     Previous psychiatric/substance abuse providers and response to treatment: no previous history     Family history of mental illness or substance abuse: unknown     Substance abuse history:UDS was positive for amphetamines       Social History     Tobacco Use    Smoking status: Current Every Day Smoker     Packs/day: 1.00    Smokeless tobacco: Never Used   Substance Use Topics    Alcohol use: Yes       History of biomedical complications associated with substance abuse :    Patient's current acceptance of treatment or motivation for change:    Family constellation: single, one child who is 10years old    Is significant other involved?        Describe support system:     Describe living arrangements and home environment:lives at home with his mother and grandmother     Health issues:   Hospital Problems  Never Reviewed          Codes Class Noted POA    Substance induced mood disorder [facility]) ICD-10-CM: F19.94  ICD-9-CM: 292.84  10/22/2020 Unknown              Trauma history: none noted     Legal issues: no    History of  service: no    Financial status: unemployed     Jainism/cultural factors: none noted     Education/work history: high school education     Have you been licensed as a health care professional (current or ): no     Leisure and recreation preferences: unknown     Describe coping skills:ineffectual [facility]  10/23/2020

## 2020-10-24 PROCEDURE — 74011250637 HC RX REV CODE- 250/637: Performed by: PSYCHIATRY & NEUROLOGY

## 2020-10-24 PROCEDURE — 65220000003 HC RM SEMIPRIVATE PSYCH

## 2020-10-24 RX ADMIN — RISPERIDONE 2 MG: 1 TABLET ORAL at 21:13

## 2020-10-24 NOTE — PROGRESS NOTES
Problem: Altered Thought Process (Adult/Pediatric)  Goal: *STG: Participates in treatment plan  Outcome: Progressing Towards Goal  Goal: *STG: Complies with medication therapy  Outcome: Progressing Towards Goal  Goal: *STG: Decreased hallucinations  Outcome: Progressing Towards Goal     Pt received resting quietly in bed. Respirations even and unlabored, NAD noted. Participated in treatment team, calm and cooperative with staff. No complaints today of worms/bugs, but c/o \"darkness\" he states has \"always been there. \"  Pt is medication and meal compliant. Staff to continue q15 minute checks for safety. Pt isolative to room for most of shift.

## 2020-10-24 NOTE — PROGRESS NOTES
Problem: Altered Thought Process (Adult/Pediatric)  Goal: *STG: Remains safe in hospital  Outcome: Progressing Towards Goal  Goal: *STG: Complies with medication therapy  Outcome: Progressing Towards Goal  Goal: *STG: Absence of lethality  Outcome: Progressing Towards Goal  Goal: Interventions  Outcome: Progressing Towards Goal

## 2020-10-24 NOTE — PROGRESS NOTES
Problem: Falls - Risk of  Goal: *Absence of Falls  Description: Document Richard Merlin Fall Risk and appropriate interventions in the flowsheet. Outcome: Progressing Towards Goal  Note: Fall Risk Interventions:            Medication Interventions: Patient to call before getting OOB            Pt. Received resting in bed, NAD, respirations even and unlabored. Will continue q15 safety rounds.

## 2020-10-24 NOTE — BH NOTES
GROUP THERAPY PROGRESS NOTE    Patient did not participate in Discharge/Goals Group.      Macho Ortiz, Supervisee in Social Work

## 2020-10-25 PROCEDURE — 74011250637 HC RX REV CODE- 250/637: Performed by: PSYCHIATRY & NEUROLOGY

## 2020-10-25 PROCEDURE — 65220000003 HC RM SEMIPRIVATE PSYCH

## 2020-10-25 RX ADMIN — RISPERIDONE 2 MG: 1 TABLET ORAL at 21:42

## 2020-10-25 NOTE — PROGRESS NOTES
Problem: Altered Thought Process (Adult/Pediatric)  Goal: *STG: Remains safe in hospital  Outcome: Progressing Towards Goal  Goal: *STG: Complies with medication therapy  Outcome: Progressing Towards Goal  Goal: *STG: Decreased delusional thinking  Outcome: Progressing Towards Goal  Goal: *STG: Decreased hallucinations  Outcome: Progressing Towards Goal  Goal: *STG: Absence of lethality  Outcome: Progressing Towards Goal  Goal: Interventions  Outcome: Progressing Towards Goal

## 2020-10-25 NOTE — PROGRESS NOTES
Problem: Altered Thought Process (Adult/Pediatric)  Goal: *STG: Participates in treatment plan  Outcome: Progressing Towards Goal  Goal: *STG: Remains safe in hospital  Outcome: Progressing Towards Goal  Goal: *STG: Complies with medication therapy  Outcome: Progressing Towards Goal  Goal: *STG: Decreased hallucinations  Outcome: Progressing Towards Goal     Pt received resting quietly in bed. Respirations even and unlabored, NAD noted. Staff to continue q15 minute checks for safety. Pt participated in treatment team.  Improvement in ability to express his thoughts. No complaints of tactile/visual hallucinations. Pt takes medication as scheduled and agrees to receiving education about his medication.

## 2020-10-25 NOTE — PROGRESS NOTES
Pt receiving continuous q15m safety checks, pt currently asleep resting comfortably in bed. No distress noted, respiratory WNL. Supplemental lighting utilized. Problem: Altered Thought Process (Adult/Pediatric)  Goal: *STG: Remains safe in hospital  Outcome: Progressing Towards Goal     Problem: Falls - Risk of  Goal: *Absence of Falls  Description: Document Adriana Fall Risk and appropriate interventions in the flowsheet.   Outcome: Progressing Towards Goal  Note: Fall Risk Interventions:            Medication Interventions: Teach patient to arise slowly

## 2020-10-25 NOTE — BH NOTES
Psychiatric Progress Note    Patient: Rajat Barry MRN: [id number]  SSN: [id number]    YOB: 1985  Age: 36 y.o. Sex: male      Admit Date: 10/22/2020    LOS: 2 days     Subjective:     Rajat Barry  reports feeling as though things were crawling on him and moods are fair. Some paranoia. Denies SI/HI/AH/VH. No aggression or violence. Appropriately interactive and aware. Tolerating medications well. Eating fairly and sleeping ok after falling asleep, but has trouble falling asleep.     Objective:     Vitals:    10/24/20 0745 10/24/20 1114 10/24/20 1516 10/24/20 1956   BP: 118/85 110/75 109/74 126/80   Pulse: 96 85 91 79   Resp: 16 16 14 16   Temp: 97.6 °F (36.4 °C) 97.9 °F (36.6 °C) 98.1 °F (36.7 °C) 98.4 °F (36.9 °C)   SpO2: 97% 97% 97% 96%   Weight:       Height:            Mental Status Exam:   Sensorium  oriented to time, place and person   Relations cooperative   Eye Contact appropriate   Appearance:  age appropriate   Speech:  normal volume and non-pressured   Thought Process: goal directed   Thought Content free of delusions and free of hallucinations   Suicidal ideations none   Mood:  depressed   Affect:  constricted   Memory   adequate   Concentration:  adequate   Insight:  limited   Judgment:  impaired due to Condition       MEDICATIONS:  Current Facility-Administered Medications   Medication Dose Route Frequency    risperiDONE (RisperDAL) tablet 2 mg  2 mg Oral QHS    OLANZapine (ZyPREXA) tablet 5 mg  5 mg Oral Q6H PRN    haloperidol lactate (HALDOL) injection 5 mg  5 mg IntraMUSCular Q6H PRN    benztropine (COGENTIN) tablet 1 mg  1 mg Oral BID PRN    diphenhydrAMINE (BENADRYL) injection 50 mg  50 mg IntraMUSCular BID PRN    hydrOXYzine HCL (ATARAX) tablet 50 mg  50 mg Oral TID PRN    LORazepam (ATIVAN) injection 1 mg  1 mg IntraMUSCular Q4H PRN    traZODone (DESYREL) tablet 50 mg  50 mg Oral QHS PRN    acetaminophen (TYLENOL) tablet 650 mg  650 mg Oral Q4H PRN    magnesium hydroxide (MILK OF MAGNESIA) 400 mg/5 mL oral suspension 30 mL  30 mL Oral DAILY PRN    nicotine buccal (POLACRILEX) lozenge 2 mg  2 mg Oral Q2H PRN    influenza vaccine 2020-21 (6 mos+)(PF) (FLUARIX/FLULAVAL/FLUZONE QUAD) injection 0.5 mL  0.5 mL IntraMUSCular PRIOR TO DISCHARGE    guaiFENesin (ROBITUSSIN) 100 mg/5 mL oral liquid 100 mg  100 mg Oral Q4H PRN      DISCUSSION:   the risks and benefits of the proposed medication  patient given opportunity to ask questions    Lab/Data Review: All lab results for the last 24 hours reviewed. No results found for this or any previous visit (from the past 24 hour(s)).       Assessment:     Active Problems:    Substance induced mood disorder ([facility] [address] (10/22/2020)        Plan:     Continue current care  Collateral information  Disposition planning with social work    Signed By: Matthew Madden MD     October 24, 2020

## 2020-10-25 NOTE — INTERDISCIPLINARY ROUNDS
Behavioral Health Interdisciplinary Rounds Patient Name: Melanie Chavez  Age: 36 y.o. Room/Bed:  730/02 Primary Diagnosis: <principal problem not specified> Admission Status: Voluntary Readmission within 30 days: no 
Power of  in place: no 
Patient requires a blocked bed: no          Reason for blocked bed: VTE Prophylaxis: No 
 
Mobility needs/Fall risk: no 
Flu Vaccine : no  
Nutritional Plan:  
Consults:         
Labs/Testing due today?: no 
 
Sleep hours:  7 Participation in Care/Groups:  no 
Medication Compliant?: Yes PRNS (last 24 hours): None Restraints (last 24 hours):  no 
  
CIWA (range last 24 hours): COWS (range last 24 hours): Alcohol screening (AUDIT) completed -   AUDIT Score: 3 If applicable, date SBIRT discussed in treatment team AND documented:  
AUDIT Screen Score: AUDIT Score: 3 Document Brief Intervention (corresponds directly with the 5 A's, Ask, Advise, Assess, Assist, and Arrange): At- Risk Patients (Score 7-15 for women; 8-15 for men) Discuss concern patient is drinking at unhealthy levels known to increase risk of alcohol-related health problems. Is Patient ready to commit to change? If No: 
? Encourage reflection ? Discuss short term and long term health risks of consuming alcohol ? Barriers to change ? Reaffirm willingness to help / Educational materials provided If Yes: 
? Set goal 
? Plan 
? Educational materials provided Harmful use or Dependence (Score 16 or greater) ? Discuss short term and long term health risks of consuming alcohol ? Recommendations ? Negotiate drinking goal 
? Recommend addiction specialist/center ? Arrange follow-up appointments. Tobacco - patient is a smoker: Have You Used Tobacco in the Past 30 Days: Yes Illegal Drugs use: Have You Used Any Illegal Substances Over the Past 12 Months: Yes 
 
24 hour chart check complete: yes Patient goal(s) for today: Treatment team focus/goals:  
Progress note LOS:  3  Expected LOS:  
 
Financial concerns/prescription coverage:   
Family contact:      
Family requesting physician contact today:   
Discharge plan: Access to weapons : no Outpatient provider(s):  
Patient's preferred phone number for follow up call :  
Patient's preferred e-mail address : 
Participating treatment team members: Naga Fox, * (assigned SW),

## 2020-10-25 NOTE — BH NOTES
GROUP THERAPY PROGRESS NOTE    Patient is participating in "[employer]. Group time: 50 minutes    Personal goal for participation: Creating a safety plan for patients in crisis or having suicidal ideation      Goal orientation: Personal    Group therapy participation: active    Therapeutic interventions reviewed and discussed: Group discussion was focused answering the safety plan that [facility] is creating for patients when they discharge. Each question was explained to patients, and they had to fill out the answers individually. At the end, group members were encouraged to share parts of their safety plan or identify one takeaway from completing this. Group discussion included different ways patient could utilize this plan when they leave. Impression of participation: Lauren Bernadette actively participated in group and completed is safety plan.      Macho Ortiz, Supervisee in Social Work

## 2020-10-26 PROCEDURE — 65220000003 HC RM SEMIPRIVATE PSYCH

## 2020-10-26 PROCEDURE — 74011250637 HC RX REV CODE- 250/637: Performed by: PSYCHIATRY & NEUROLOGY

## 2020-10-26 PROCEDURE — 74011250637 HC RX REV CODE- 250/637: Performed by: NURSE PRACTITIONER

## 2020-10-26 RX ADMIN — TRAZODONE HYDROCHLORIDE 50 MG: 50 TABLET ORAL at 19:44

## 2020-10-26 RX ADMIN — RISPERIDONE 2 MG: 1 TABLET ORAL at 19:44

## 2020-10-26 NOTE — PROGRESS NOTES
Laboratory Monitoring for Antipsychotics: This patient is currently prescribed the following medication(s):   Current Facility-Administered Medications   Medication Dose Route Frequency    risperiDONE (RisperDAL) tablet 2 mg  2 mg Oral QHS    influenza vaccine 2020-21 (6 mos+)(PF) (FLUARIX/FLULAVAL/FLUZONE QUAD) injection 0.5 mL  0.5 mL IntraMUSCular PRIOR TO DISCHARGE     The following labs have been completed for monitoring of antipsychotics and/or mood stabilizers:    Height, Weight, BMI Estimation  Estimated body mass index is 20.99 kg/m² as calculated from the following:    Height as of this encounter: 182.9 cm (72\"). Weight as of this encounter: 70.2 kg (154 lb 12.8 oz). Vital Signs/Blood Pressure  Visit Vitals  /66 (BP 1 Location: Right arm, BP Patient Position: At rest)   Pulse 72   Temp 98.3 °F (36.8 °C)   Resp 14   Ht 182.9 cm (72\")   Wt 70.2 kg (154 lb 12.8 oz)   SpO2 96%   BMI 20.99 kg/m²     Renal Function, Hepatic Function and Chemistry  Estimated Creatinine Clearance: 79 mL/min (A) (by C-G formula based on SCr of 1.32 mg/dL (H)). Lab Results   Component Value Date/Time    Sodium 139 10/22/2020 01:00 PM    Potassium 4.4 10/22/2020 01:00 PM    Chloride 105 10/22/2020 01:00 PM    CO2 29 10/22/2020 01:00 PM    Anion gap 5 10/22/2020 01:00 PM    BUN 14 10/22/2020 01:00 PM    Creatinine 1.32 (H) 10/22/2020 01:00 PM    BUN/Creatinine ratio 11 (L) 10/22/2020 01:00 PM    Bilirubin, total 0.6 10/22/2020 01:00 PM    Protein, total 7.8 10/22/2020 01:00 PM    Albumin 4.2 10/22/2020 01:00 PM    Globulin 3.6 10/22/2020 01:00 PM    A-G Ratio 1.2 10/22/2020 01:00 PM    ALT (SGPT) 61 10/22/2020 01:00 PM    AST (SGOT) 24 10/22/2020 01:00 PM    Alk.  phosphatase 102 10/22/2020 01:00 PM     Lab Results   Component Value Date/Time    Glucose 81 10/22/2020 01:00 PM     No results found for: HBA1C, HGBE8, ODY5VSCG    Hematology  Lab Results   Component Value Date/Time    WBC 6.9 10/22/2020 01:00 PM    RBC 5.48 10/22/2020 01:00 PM    HGB 15.3 10/22/2020 01:00 PM    HCT 46.6 10/22/2020 01:00 PM    MCV 85.0 10/22/2020 01:00 PM    MCH 27.9 10/22/2020 01:00 PM    MCHC 32.8 10/22/2020 01:00 PM    RDW 12.8 10/22/2020 01:00 PM    PLATELET 649 88/31/4192 01:00 PM     Lipids  No results found for: CHOL, CHOLX, CHLST, CHOLV, 850779, HDL, HDLP, LDL, LDLC, DLDLP, TGLX, TRIGL, TRIGP, CHHD, CHHDX    Thyroid Function  Lab Results   Component Value Date/Time    TSH 0.42 10/23/2020 04:08 AM     Assessment/Plan:  Will order lipid panel and hemoglobin A1c or fasting glucose to complete the recommended baseline laboratory monitoring based on the patient's current medication regimen.         ANANYA AragonD

## 2020-10-26 NOTE — GROUP NOTE
DANIELLA  GROUP DOCUMENTATION INDIVIDUAL Group Therapy Note Date: 10/26/2020 Group Start Time: 1900 Group End Time: 1930 Group Topic: Reflection/Relaxation [address] Margaret Almendarez [facility] GROUP DOCUMENTATION GROUP Group Therapy Note Attendees: 8/10 patients present Attendance: Attended Patient's Goal:  N/A Interventions/techniques: Informed Follows Directions: Followed directions Interactions: Interacted appropriately Mental Status: Calm Behavior/appearance: Cooperative Goals Achieved: Able to engage in interactions Additional Notes:  Patient attended group and was quiet, but full participated. Abigail Burns

## 2020-10-26 NOTE — BH NOTES
Psychiatric Progress Note    Patient: Almarie Cabot MRN: [id number]  SSN: [id number]    YOB: 1985  Age: 36 y.o. Sex: male      Admit Date: 10/22/2020    LOS: 4 days     Subjective:   I feel okay now. Objective:   Kourtney reports feeling better today. Says he has not noticed any insects or worms on his skin but is still convinced that he was infested. Says he is just \"a little depressed\" today but otherwise feels \"fine\". Denies any adverse events noted from his medications. He remains motivated to abstain from using drugs and going to the [facility]. Pleasant and calm and denies any SI or plan to me. He is attending groups and participates in the milieu.        Vitals:    10/25/20 0830 10/25/20 1147 10/25/20 1608 10/26/20 0725   BP: 119/81 103/60 115/75 104/66   Pulse: 78 64 82 72   Resp: 16 14 16 14   Temp: 97.3 °F (36.3 °C) 97.9 °F (36.6 °C) 98 °F (36.7 °C) 98.3 °F (36.8 °C)   SpO2: 98% 100% 96% 96%   Weight:  70.2 kg (154 lb 12.8 oz)     Height:            Mental Status Exam:   Sensorium  oriented to time, place and person   Relations cooperative   Eye Contact appropriate   Appearance:  age appropriate   Speech:  normal volume and non-pressured   Thought Process: goal directed   Thought Content free of delusions and free of hallucinations   Suicidal ideations none   Mood:  depressed   Affect:  constricted   Memory   adequate   Concentration:  adequate   Insight:  limited   Judgment:  impaired due to Condition       MEDICATIONS:  Current Facility-Administered Medications   Medication Dose Route Frequency    risperiDONE (RisperDAL) tablet 2 mg  2 mg Oral QHS    OLANZapine (ZyPREXA) tablet 5 mg  5 mg Oral Q6H PRN    haloperidol lactate (HALDOL) injection 5 mg  5 mg IntraMUSCular Q6H PRN    benztropine (COGENTIN) tablet 1 mg  1 mg Oral BID PRN    diphenhydrAMINE (BENADRYL) injection 50 mg  50 mg IntraMUSCular BID PRN    hydrOXYzine HCL (ATARAX) tablet 50 mg  50 mg Oral TID PRN    LORazepam (ATIVAN) injection 1 mg  1 mg IntraMUSCular Q4H PRN    traZODone (DESYREL) tablet 50 mg  50 mg Oral QHS PRN    acetaminophen (TYLENOL) tablet 650 mg  650 mg Oral Q4H PRN    magnesium hydroxide (MILK OF MAGNESIA) 400 mg/5 mL oral suspension 30 mL  30 mL Oral DAILY PRN    nicotine buccal (POLACRILEX) lozenge 2 mg  2 mg Oral Q2H PRN    influenza vaccine 2020-21 (6 mos+)(PF) (FLUARIX/FLULAVAL/FLUZONE QUAD) injection 0.5 mL  0.5 mL IntraMUSCular PRIOR TO DISCHARGE    guaiFENesin (ROBITUSSIN) 100 mg/5 mL oral liquid 100 mg  100 mg Oral Q4H PRN      DISCUSSION:   the risks and benefits of the proposed medication  patient given opportunity to ask questions    Lab/Data Review: All lab results for the last 24 hours reviewed. No results found for this or any previous visit (from the past 24 hour(s)). Assessment:   Unspecified psychosis, rule out substance-induced psychosis, methamphetamine use disorder, severe.       Plan:     Continue current care  Collateral information  Encourage PRN use  Disposition planning with social work    Signed By: Jannet Salgado MD     October 26, 2020

## 2020-10-26 NOTE — PROGRESS NOTES
Problem: Altered Thought Process (Adult/Pediatric)  Goal: *STG: Participates in treatment plan  Outcome: Progressing Towards Goal  Goal: *STG: Remains safe in hospital  Outcome: Progressing Towards Goal  Goal: *STG: Attends activities and groups  Outcome: Progressing Towards Goal  Note: 0845 Pt alert and calm, resting quietly in room. No distress noted. Will monitor with Q 15 safety checks.

## 2020-10-26 NOTE — INTERDISCIPLINARY ROUNDS
Behavioral Health Interdisciplinary Rounds Patient Name: Keegan Payan  Age: 36 y.o. Room/Bed:  730/02 Primary Diagnosis: <principal problem not specified> Admission Status: Voluntary Readmission within 30 days: no 
Power of  in place: no 
Patient requires a blocked bed: no          Reason for blocked bed: VTE Prophylaxis: No 
 
Mobility needs/Fall risk: no 
Flu Vaccine : no  
Nutritional Plan: no 
Consults:         
Labs/Testing due today?: no 
 
Sleep hours:  8.5 Participation in Care/Groups:  yes Medication Compliant?: Yes PRNS (last 24 hours): None Restraints (last 24 hours):  no 
  
CIWA (range last 24 hours): COWS (range last 24 hours): Alcohol screening (AUDIT) completed -   AUDIT Score: 3 If applicable, date SBIRT discussed in treatment team AND documented:  
AUDIT Screen Score: AUDIT Score: 3 Document Brief Intervention (corresponds directly with the 5 A's, Ask, Advise, Assess, Assist, and Arrange): At- Risk Patients (Score 7-15 for women; 8-15 for men) Discuss concern patient is drinking at unhealthy levels known to increase risk of alcohol-related health problems. Is Patient ready to commit to change? If No: 
? Encourage reflection ? Discuss short term and long term health risks of consuming alcohol ? Barriers to change ? Reaffirm willingness to help / Educational materials provided If Yes: 
? Set goal 
? Plan 
? Educational materials provided Harmful use or Dependence (Score 16 or greater) ? Discuss short term and long term health risks of consuming alcohol ? Recommendations ? Negotiate drinking goal 
? Recommend addiction specialist/center ? Arrange follow-up appointments. Tobacco - patient is a smoker: Have You Used Tobacco in the Past 30 Days: Yes Illegal Drugs use: Have You Used Any Illegal Substances Over the Past 12 Months: Yes 
 
24 hour chart check complete: Patient goal(s) for today: attend groups, take medications Treatment team focus/goals: titrate medication, coordinate follow up. Progress note: Patient said he is not seeing insects but believes \"they really existed. \" Denied SI. Patient said he feels \"a little depressed. \" Discharge tomorrow. LOS:  4  Expected LOS: 10/27/2020. Financial concerns/prescription coverage: medicaid Family contact: mother - Patient signed a TEE for his mother Ms. Rufino Whitehead - [phone] SW spoke to his mother - She is very supportive of treatment - Patient is involved in [facility] and they have recommend a 30 day treatment program in [address] [place] [address] - [phone] ext - [phone] [phone] fax Participating treatment team members: Naga Fox, Olga Chilel, MSW; Dr. Alex Gomez; Ana Germain, PharmD; Ginny Herbert, RN, Ginny Herbert, RN.

## 2020-10-26 NOTE — BH NOTES
Psychiatric Progress Note    Patient: Keiko Bagley MRN: [id number]  SSN: [id number]    YOB: 1985  Age: 36 y.o. Sex: male      Admit Date: 10/22/2020    LOS: 3 days     Subjective:     Keiko Bagley  reports feeling as though things were crawling on him and moods are fair. Some paranoia. Denies SI/HI/AH/VH. No aggression or violence. Appropriately interactive and aware. Tolerating medications well. Eating fairly and sleeping ok after falling asleep, but has trouble falling asleep. 10/25 - Keiko Bagley reports doing alright and moods are fair. Denies SI/HI/AH/VH. No aggression or violence. Appropriately interactive and aware. Tolerating medications well. Eating well but notes trouble falling asleep.       Objective:     Vitals:    10/24/20 1956 10/25/20 0830 10/25/20 1147 10/25/20 1608   BP: 126/80 119/81 103/60 115/75   Pulse: 79 78 64 82   Resp: 16 16 14 16   Temp: 98.4 °F (36.9 °C) 97.3 °F (36.3 °C) 97.9 °F (36.6 °C) 98 °F (36.7 °C)   SpO2: 96% 98% 100% 96%   Weight:   70.2 kg (154 lb 12.8 oz)    Height:            Mental Status Exam:   Sensorium  oriented to time, place and person   Relations cooperative   Eye Contact appropriate   Appearance:  age appropriate   Speech:  normal volume and non-pressured   Thought Process: goal directed   Thought Content free of delusions and free of hallucinations   Suicidal ideations none   Mood:  depressed   Affect:  constricted   Memory   adequate   Concentration:  adequate   Insight:  limited   Judgment:  impaired due to Condition       MEDICATIONS:  Current Facility-Administered Medications   Medication Dose Route Frequency    risperiDONE (RisperDAL) tablet 2 mg  2 mg Oral QHS    OLANZapine (ZyPREXA) tablet 5 mg  5 mg Oral Q6H PRN    haloperidol lactate (HALDOL) injection 5 mg  5 mg IntraMUSCular Q6H PRN    benztropine (COGENTIN) tablet 1 mg  1 mg Oral BID PRN    diphenhydrAMINE (BENADRYL) injection 50 mg  50 mg IntraMUSCular BID PRN    hydrOXYzine HCL (ATARAX) tablet 50 mg  50 mg Oral TID PRN    LORazepam (ATIVAN) injection 1 mg  1 mg IntraMUSCular Q4H PRN    traZODone (DESYREL) tablet 50 mg  50 mg Oral QHS PRN    acetaminophen (TYLENOL) tablet 650 mg  650 mg Oral Q4H PRN    magnesium hydroxide (MILK OF MAGNESIA) 400 mg/5 mL oral suspension 30 mL  30 mL Oral DAILY PRN    nicotine buccal (POLACRILEX) lozenge 2 mg  2 mg Oral Q2H PRN    influenza vaccine 2020-21 (6 mos+)(PF) (FLUARIX/FLULAVAL/FLUZONE QUAD) injection 0.5 mL  0.5 mL IntraMUSCular PRIOR TO DISCHARGE    guaiFENesin (ROBITUSSIN) 100 mg/5 mL oral liquid 100 mg  100 mg Oral Q4H PRN      DISCUSSION:   the risks and benefits of the proposed medication  patient given opportunity to ask questions    Lab/Data Review: All lab results for the last 24 hours reviewed. No results found for this or any previous visit (from the past 24 hour(s)).       Assessment:     Active Problems:    Substance induced mood disorder ([facility] [address] (10/22/2020)        Plan:     Continue current care  Collateral information  Encourage PRN use  Disposition planning with social work    Signed By: Hafsa Cool MD     October 25, 2020

## 2020-10-26 NOTE — PROGRESS NOTES
Problem: Altered Thought Process (Adult/Pediatric)  Goal: *STG: Remains safe in hospital  Outcome: Progressing Towards Goal     Problem: Falls - Risk of  Goal: *Absence of Falls  Description: Document Adriana Fall Risk and appropriate interventions in the flowsheet.   Outcome: Progressing Towards Goal  Note: Fall Risk Interventions:            Medication Interventions: Teach patient to arise slowly

## 2020-10-27 VITALS
HEART RATE: 86 BPM | OXYGEN SATURATION: 98 % | DIASTOLIC BLOOD PRESSURE: 87 MMHG | WEIGHT: 154.8 LBS | SYSTOLIC BLOOD PRESSURE: 125 MMHG | TEMPERATURE: 98.3 F | RESPIRATION RATE: 16 BRPM | HEIGHT: 72 IN | BODY MASS INDEX: 20.97 KG/M2

## 2020-10-27 LAB
CHOLEST SERPL-MCNC: 178 MG/DL
EST. AVERAGE GLUCOSE BLD GHB EST-MCNC: 108 MG/DL
HBA1C MFR BLD: 5.4 % (ref 4–5.6)
HDLC SERPL-MCNC: 57 MG/DL
HDLC SERPL: 3.1 {RATIO} (ref 0–5)
LDLC SERPL CALC-MCNC: 101.8 MG/DL (ref 0–100)
LIPID PROFILE,FLP: ABNORMAL
TRIGL SERPL-MCNC: 96 MG/DL (ref ?–150)
VLDLC SERPL CALC-MCNC: 19.2 MG/DL

## 2020-10-27 PROCEDURE — 80061 LIPID PANEL: CPT

## 2020-10-27 PROCEDURE — 83036 HEMOGLOBIN GLYCOSYLATED A1C: CPT

## 2020-10-27 PROCEDURE — 36415 COLL VENOUS BLD VENIPUNCTURE: CPT

## 2020-10-27 RX ORDER — RISPERIDONE 2 MG/1
2 TABLET, FILM COATED ORAL
Qty: 30 TAB | Refills: 0 | Status: SHIPPED | OUTPATIENT
Start: 2020-10-27

## 2020-10-27 RX ORDER — TRAZODONE HYDROCHLORIDE 50 MG/1
50 TABLET ORAL
Qty: 30 TAB | Refills: 0 | Status: SHIPPED | OUTPATIENT
Start: 2020-10-27

## 2020-10-27 NOTE — PROGRESS NOTES
Pharmacist Discharge Medication Reconciliation    Discharging Provider: Dr. Mei Amador PMH:   Past Medical History:   Diagnosis Date    Substance abuse [facility])      Chief Complaint for this Admission: No chief complaint on file. Allergies: Patient has no known allergies. Discharge Medications:   Current Discharge Medication List        START taking these medications    Details   risperiDONE (RisperDAL) 2 mg tablet Take 1 Tab by mouth nightly. Indications: psychosis  Qty: 30 Tab, Refills: 0      traZODone (DESYREL) 50 mg tablet Take 1 Tab by mouth nightly as needed for Sleep (For insomnia).  Indications: insomnia associated with depression  Qty: 30 Tab, Refills: 0           The patient's chart, MAR and AVS were reviewed by ANANYA LoredoD.

## 2020-10-27 NOTE — PROGRESS NOTES
Problem: Altered Thought Process (Adult/Pediatric)  Goal: *STG: Complies with medication therapy  Outcome: Progressing Towards Goal  Goal: *STG: Decreased hallucinations  Outcome: Progressing Towards Goal     Problem: Patient Education: Go to Patient Education Activity  Goal: Patient/Family Education  Outcome: Progressing Towards Goal

## 2020-10-27 NOTE — BH NOTES
Behavioral Health Transition Record to Provider    Patient Name: Scottie Tracey  YOB: 1985  Medical Record Number: [id number]  Date of Admission: 10/22/2020  Date of Discharge: 10/27/2020       Attending Provider: Crow Salter MD  Discharging Provider: Dr. Kenzie Laurent   To contact this individual call [phone] and ask the  to page. If unavailable, ask to be transferred to [address] [place] [address] Provider on call. [facility] Provider will be available on call 24/7 and during holidays. Primary Care Provider: None    No Known Allergies    Reason for Admission:    CHIEF COMPLAINT:  \"I was not feeling too well. \"     HISTORY OF PRESENT ILLNESS:  The patient is a 46-year-old [facility] [place] man who is currently admitted to our psychiatric facility after he was transferred to us from the ER at [address] had been brought there by his mother with a history of having expressed thoughts of suicide. Harjeet Napier had also been concerned because his behavior had changed including being more paranoid, walking around the house with a knife and staying up all hours of the night thinking somebody was going to come into the home. Leonor Dickson had also expressed the belief that he was infested by parasites which started about a week ago, according to the patient. Leonor Dickson has reportedly been shaving all of the hair of his body because he thinks he can see the worms and says that he brought one worm into the ER with him but nobody believed him. Leonor Dickson states that he started using meth about a year ago and uses it almost every day.  His last use was 2 days ago.  Since his meth use, he has been more paranoid, but he does not believe that it contributes to his feeling that he has worms on his skin. Leonor Dickson has a history of using other drugs including marijuana, cocaine, and prescription opiates, but has not used them in more than a year.  Notes that he is sleeping poorly and has not been able to go to work for the past year because of his mood.  He is agreeable to starting medication and has been calm and cooperative since his admission on the floor.  Denies any auditory hallucinations. Admission Diagnosis: Substance induced mood disorder ([facility] [address] [F19.94]    * No surgery found *    Results for orders placed or performed during the hospital encounter of 10/22/20   TSH 3RD GENERATION   Result Value Ref Range    TSH 0.42 0.36 - 3.74 uIU/mL   LIPID PANEL   Result Value Ref Range    LIPID PROFILE          Cholesterol, total 178 <200 MG/DL    Triglyceride 96 <150 MG/DL    HDL Cholesterol 57 MG/DL    LDL, calculated 101.8 (H) 0 - 100 MG/DL    VLDL, calculated 19.2 MG/DL    CHOL/HDL Ratio 3.1 0.0 - 5.0         Immunizations administered during this encounter: There is no immunization history for the selected administration types on file for this patient. Screening for Metabolic Disorders for Patients on Antipsychotic Medications  (Data obtained from the EMR)    Estimated Body Mass Index  Estimated body mass index is 20.99 kg/m² as calculated from the following:    Height as of this encounter: 6' (1.829 m). Weight as of this encounter: 70.2 kg (154 lb 12.8 oz).      Vital Signs/Blood Pressure  Visit Vitals  /87 (BP 1 Location: Left arm, BP Patient Position: Sitting)   Pulse 86   Temp 98.3 °F (36.8 °C)   Resp 16   Ht 6' (1.829 m)   Wt 70.2 kg (154 lb 12.8 oz)   SpO2 98%   BMI 20.99 kg/m²       Blood Glucose/Hemoglobin A1c  Lab Results   Component Value Date/Time    Glucose 81 10/22/2020 01:00 PM       No results found for: HBA1C, HGBE8, NJU6MNUC     Lipid Panel  Lab Results   Component Value Date/Time    Cholesterol, total 178 10/27/2020 05:47 AM    HDL Cholesterol 57 10/27/2020 05:47 AM    LDL, calculated 101.8 (H) 10/27/2020 05:47 AM    Triglyceride 96 10/27/2020 05:47 AM    CHOL/HDL Ratio 3.1 10/27/2020 05:47 AM        Discharge Diagnosis: Unspecified psychosis, rule out substance-induced psychosis, methamphetamine use disorder, severe. Discharge Plan:    The patient Estelita Lundborg exhibits the ability to control behavior in a less restrictive environment. Patient's level of functioning is improving. No assaultive/destructive behavior has been observed for the past 24 hours. No suicidal/homicidal threat or behavior has been observed for the past 24 hours. There is no evidence of serious medication side effects. Patient has not been in physical or protective restraints for at least the past 24 hours. If weapons involved, how are they secured? No weapons. Is patient aware of and in agreement with discharge plan? Yes     Arrangements for medication:  Medications filled here. Copy of discharge instructions to provider?:  Geetha Weldon; [phone] Treatment     Arrangements for transportation home:  Mom to . Keep all follow up appointments as scheduled, continue to take prescribed medications per physician instructions. Mental health crisis number:  260 or your local mental health crisis line number at [phone]            Discharge Medication List and Instructions:   Current Discharge Medication List      START taking these medications    Details   risperiDONE (RisperDAL) 2 mg tablet Take 1 Tab by mouth nightly. Indications: psychosis  Qty: 30 Tab, Refills: 0      traZODone (DESYREL) 50 mg tablet Take 1 Tab by mouth nightly as needed for Sleep (For insomnia). Indications: insomnia associated with depression  Qty: 30 Tab, Refills: 0             Unresulted Labs (24h ago, onward)    None        To obtain results of studies pending at discharge, please contact [phone]    Follow-up Information     Follow up With Specialties Details Why Contact Mook Cope,  with [facility]  Call on 10/27/2020 In the afternoon for follow-up. [phone]       [facility]    They will call you to let you know when there is a bed available.   [address] [place] [address] [address]  [phone] option 1     None    None (395) Patient stated that they have no PCP            Advanced Directive:   Does the patient have an appointed surrogate decision maker? No  Does the patient have a Medical Advance Directive? No  Does the patient have a Psychiatric Advance Directive? No  If the patient does not have a surrogate or Medical Advance Directive AND Psychiatric Advance Directive, the patient was offered information on these advance directives Patient declined to complete    Patient Instructions: Please continue all medications until otherwise directed by physician. Tobacco Cessation Discharge Plan:   Is the patient a smoker and needs referral for smoking cessation? Yes  Patient referred to the following for smoking cessation with an appointment? Refused     Patient was offered medication to assist with smoking cessation at discharge? Refused  Was education for smoking cessation added to the discharge instructions? Yes    Alcohol/Substance Abuse Discharge Plan:   Does the patient have a history of substance/alcohol abuse and requires a referral for treatment? Yes  Patient referred to the following for substance/alcohol abuse treatment with an appointment? Yes, [facility] will call patient when a bed is available. He completed the assessment. Patient was offered medication to assist with alcohol cessation at discharge? No  Was education for substance/alcohol abuse added to discharge instructions? Yes    Patient discharged to Home; discussed with patient/caregiver and provided to the patient/caregiver either in hard copy or electronically.

## 2020-10-27 NOTE — INTERDISCIPLINARY ROUNDS
Behavioral Health Interdisciplinary Rounds Patient Name: Jake Gonzales  Age: 36 y.o. Room/Bed:  730/02 Primary Diagnosis: <principal problem not specified> Admission Status: Voluntary Readmission within 30 days: no 
Power of  in place: no 
Patient requires a blocked bed: no          Reason for blocked bed: VTE Prophylaxis: No 
 
Mobility needs/Fall risk: no 
Flu Vaccine : no  
Nutritional Plan: no 
Consults:         
Labs/Testing due today?: Yes/ Completed Sleep hours: 8hrs Participation in Care/Groups:  yes Medication Compliant?: Yes PRNS (last 24 hours): Sleep Aid Restraints (last 24 hours):  no 
  
CIWA (range last 24 hours): COWS (range last 24 hours): Alcohol screening (AUDIT) completed -   AUDIT Score: 3 If applicable, date SBIRT discussed in treatment team AND documented:  
AUDIT Screen Score: AUDIT Score: 3 Document Brief Intervention (corresponds directly with the 5 A's, Ask, Advise, Assess, Assist, and Arrange): At- Risk Patients (Score 7-15 for women; 8-15 for men) Discuss concern patient is drinking at unhealthy levels known to increase risk of alcohol-related health problems. Is Patient ready to commit to change? If No: 
? Encourage reflection ? Discuss short term and long term health risks of consuming alcohol ? Barriers to change ? Reaffirm willingness to help / Educational materials provided If Yes: 
? Set goal 
? Plan 
? Educational materials provided Harmful use or Dependence (Score 16 or greater) ? Discuss short term and long term health risks of consuming alcohol ? Recommendations ? Negotiate drinking goal 
? Recommend addiction specialist/center ? Arrange follow-up appointments. Tobacco - patient is a smoker: Have You Used Tobacco in the Past 30 Days: Yes Illegal Drugs use: Have You Used Any Illegal Substances Over the Past 12 Months: Yes 
 
24 hour chart check complete: yes Patient goal(s) for today: prepare for discharge Treatment team focus/goals: reconcile medications and facilitate discharge. Progress note: Pt is alert, oriented, calm and cooperative,and psychiatrically ready for discharge. MSW spoke to [facility] and she said there are no beds available today. Suggested we send him to a CSU for beds patient said he does not think that is the safest option. Patient will return home today. Mother is aware of this. 10/27/2020 LOS:  5  Expected LOS: 10/27/2020 Financial concerns/prescription coverage: Medicaid Family contact: mother - Patient signed a TEE for his mother Ms. Marie Pxftx - [phone]  
SW spoke to his mother - She is very supportive of treatment - Patient is involved in [facility] and they have recommend a 30 day treatment program in [address] [place] [address] [phone] [facility] 0874     [phone] fax Family requesting physician contact today: No  
Discharge plan: To return home and then the [employer] to weapons: No     
Outpatient provider(s): [facility] Patient's preferred phone number for follow up call:  
Patient's preferred e-mail address: 
 
Participating treatment team members: Severo Keas, Oleh Medal, MSW; Dr. Phylicia Dyer; Beena Leblanc, PharmD; Paty Garcia RN.

## 2020-10-27 NOTE — DISCHARGE SUMMARY
DISCHARGE SUMMARY    Some parts of the discharge summary are from the initial Psychiatric interview that was done on admission by the admitting psychiatrist.      Date of Admission: 10/22/2020    Date of Discharge:10/27/2020     TYPE OF DISCHARGE:   REGULAR -  YES    AMA  RELEASED BY THE TDO COURT     CHIEF COMPLAINT:  \"I was not feeling too well. \"     HISTORY OF PRESENT ILLNESS:  The patient is a 30-year-old [employer] man who is currently admitted to our psychiatric facility after he was transferred to us from the ER at [facility].  He had been brought there by his mother with a history of having expressed thoughts of suicide. She had also been concerned because his behavior had changed including being more paranoid, walking around the house with a knife and staying up all hours of the night thinking somebody was going to come into the home. He had also expressed the belief that he was infested by parasites which started about a week ago, according to the patient. He has reportedly been shaving all of the hair of his body because he thinks he can see the worms and says that he brought one worm into the ER with him but nobody believed him. He states that he started using meth about a year ago and uses it almost every day. His last use was 2 days ago. Since his meth use, he has been more paranoid, but he does not believe that it contributes to his feeling that he has worms on his skin. He has a history of using other drugs including marijuana, cocaine, and prescription opiates, but has not used them in more than a year. Notes that he is sleeping poorly and has not been able to go to work for the past year because of his mood. He is agreeable to starting medication and has been calm and cooperative since his admission on the floor.   Denies any auditory hallucinations.     PAST MEDICAL HISTORY:  Reviewed as per the history and physical exam.             Past Medical History:   Diagnosis Date  Substance abuse [facility])        Prior to Admission medications    Not on File             Vitals:     10/25/20 0830 10/25/20 1147 10/25/20 1608 10/26/20 0725   BP: 119/81 103/60 115/75 104/66   Pulse: 78 64 82 72   Resp: 16 14 16 14   Temp: 97.3 °F (36.3 °C) 97.9 °F (36.6 °C) 98 °F (36.7 °C) 98.3 °F (36.8 °C)   SpO2: 98% 100% 96% 96%   Weight:   70.2 kg (154 lb 12.8 oz)       Height:                    Lab Results   Component Value Date/Time     WBC 6.9 10/22/2020 01:00 PM     HGB 15.3 10/22/2020 01:00 PM     HCT 46.6 10/22/2020 01:00 PM     PLATELET 057 45/12/4920 01:00 PM     MCV 85.0 10/22/2020 01:00 PM            Lab Results   Component Value Date/Time     Sodium 139 10/22/2020 01:00 PM     Potassium 4.4 10/22/2020 01:00 PM     Chloride 105 10/22/2020 01:00 PM     CO2 29 10/22/2020 01:00 PM     Anion gap 5 10/22/2020 01:00 PM     Glucose 81 10/22/2020 01:00 PM     BUN 14 10/22/2020 01:00 PM     Creatinine 1.32 (H) 10/22/2020 01:00 PM     BUN/Creatinine ratio 11 (L) 10/22/2020 01:00 PM     GFR est AA >60 10/22/2020 01:00 PM     GFR est non-AA >60 10/22/2020 01:00 PM     Calcium 9.5 10/22/2020 01:00 PM     Bilirubin, total 0.6 10/22/2020 01:00 PM     Alk. phosphatase 102 10/22/2020 01:00 PM     Protein, total 7.8 10/22/2020 01:00 PM     Albumin 4.2 10/22/2020 01:00 PM     Globulin 3.6 10/22/2020 01:00 PM     A-G Ratio 1.2 10/22/2020 01:00 PM     ALT (SGPT) 61 10/22/2020 01:00 PM     AST (SGOT) 24 10/22/2020 01:00 PM      No results found for: VALF2, VALAC, VALP, VALPR, DS6, CRBAM, CRBAMP, CARB2, XCRBAM  No results found for: LITHM  RADIOLOGY REPORTS:(reviewed/updated 10/26/2020)  No results found.        PAST PSYCHIATRIC HISTORY:  The patient reports that when he was in 8th grade, he was feeling very stressed out and was taken to see a counselor by his mother a few times but does not remember the exact details. He thinks that he was not started on any medications. His substance abuse history is noted as above. Denies any prior history of psychiatric hospitalizations or suicide attempts. Denies any delusions.     PSYCHOSOCIAL HISTORY:  The patient currently lives in [facility] where he lives with his mother and his grandmother. States that he works for a  and he has known for several years, but has not been able to go to work for the past week. He has never been , but does have a 10year-old daughter who also lives in [facility] with the biological mother. He has visitation rights but lost custody and says he gets to see her once or twice in a month. Currently, he is not paying child support for her. Denies any major legal or financial stressors, although there appears to be a history of recent arrest for drug-related charges.     MENTAL STATUS EXAMINATION:  The patient is a young [employer] [facility] [place] man who is dressed in hospital apparel. He is calm and cooperative during the interview and makes fair eye contact. Speech is spontaneous and coherent. Psychomotor activity is within normal limits. Mood is reported as little down and affect is flat. Delusion of parasitic infestation is present as described above. Ideas of reference and persecution are also present. Denies any auditory or visual hallucinations. His thought process is logical and goal directed. Cognitively, he is awake and alert, oriented to time, place, and person. Intelligence is average. Memory is intact and fund of knowledge is adequate. Insight is partial.  Judgment is poor.     ASSESSMENT AND PLAN/DIAGNOSES:  Unspecified psychosis, rule out substance-induced psychosis, methamphetamine use disorder, severe.     At the present time, we will continue his inpatient stay. He will be provided with support and attend groups. Estimated length of stay is 5-7 days. His strengths include his ability to seek help and support from his mother.     Course in the Hospital:     Patient was admitted to the inpatient psychiatry unit for acute psychiatric stabilization in regards to symptomatology as described in the HPI above and placed on Q15 minute checks and withdrawal precautions. While on the unit Arrie Nageotte was involved in individual, group, occupational and milieu therapy. He was started back on his usual medication regimen as well as PRN medications including Risperidone and Trazodone for sleep. He improved gradually and was able to integrate into the milieu with help from the nursing staff. Patients symptoms improved gradually including AH, tactile hallucinations, delusions, poor sleep, low moods, SI. He was quite on the unit, appropriate in his interactions, and cooperative with medications and the unit routine. Please see individual progress notes for more specific details regarding patient's hospitalization course. Patient was discharged as per the plan. He had been doing well on the unit as per the report of the nursing staff and my observations. No PRN medication for agitation, seclusion or restraints were required during the last 48 hours of her stay. Arrie Nageotte had improved progressively to the point of being stable for discharge and outpatient FU. At this time he did not offer any complaints. Patient denied any SI or HI. Denied any AH or VH. He denied any delusions. Was not considered a danger to self or to others and is safe for discharge. Will FU with his appointments and remains motivated to be in treatment. The patient verbalized understanding of his discharge instructions. DISCHARGE DIAGNOSIS:  Unspecified psychosis, rule out substance-induced psychosis, methamphetamine use disorder, severe. MENTAL STATUS EXAM ON DISCHARGE:    General appearance:   Arrie Nageotte is a 36 y.o.  BLACK OR  male who is well groomed, psychomotor activity is WNL  Eye contact: makes good eye contact  Speech: Spontaneous and coherent  Affect : Euthymic  Mood: \"OK\"  Thought Process: Logical, goal directed  Perception: Denies any AH or VH. Thought Content: Denies any SI or Plan  Insight: Partial  Judgement: Fair  Cognition: Intact grossly. Current Discharge Medication List      START taking these medications    Details   risperiDONE (RisperDAL) 2 mg tablet Take 1 Tab by mouth nightly. Indications: psychosis  Qty: 30 Tab, Refills: 0      traZODone (DESYREL) 50 mg tablet Take 1 Tab by mouth nightly as needed for Sleep (For insomnia). Indications: insomnia associated with depression  Qty: 30 Tab, Refills: 0            No results found for: VALF2, VALAC, VALP, VALPR, DS6, CRBAM, CRBAMP, CARB2, XCRBAM  No results found for: LITHM  Follow-up Information     Follow up With Specialties Details Why Contact Info    Timmy Merritt,  with [facility]  Call on 10/27/2020 In the afternoon for follow-up. [phone]       "[employer]    They will call you to let you know when there is a bed available. [address]  [address]  [address] [facility]  [phone] option 1     None    None (395) Patient stated that they have no PCP          WOUND CARE: none needed. PROGNOSIS:   Good / Fair based on nature of patient's pathology/ies and treatment compliance issues. Prognosis is greatly dependent upon patient's ability to  follow up on psychiatric/psychotherapy appointments as well as to comply with psychiatric medications as prescribed.

## 2020-10-27 NOTE — PROGRESS NOTES
Problem: Discharge Planning  Goal: *Discharge to safe environment  Outcome: Progressing Towards Goal  Note: Patient has a safe home to return to and supportive family. Goal: *Knowledge of medication management  Outcome: Progressing Towards Goal  Note: Pt agrees to take medications as prescribed. Goal: *Knowledge of discharge instructions  Outcome: Progressing Towards Goal  Note: Patient verbalized understanding of goals for tx and discharge.

## 2020-10-27 NOTE — PROGRESS NOTES
Problem: Falls - Risk of  Goal: *Absence of Falls  Description: Document Toni Jaimes Fall Risk and appropriate interventions in the flowsheet.   Outcome: Progressing Towards Goal  Note: Fall Risk Interventions:            Medication Interventions: Teach patient to arise slowly    Pt appears asleep in bed, NAD, even respirations, will continue to monitor q15min

## 2020-10-27 NOTE — DISCHARGE INSTRUCTIONS
DISCHARGE SUMMARY    NAME:Kourtney Kincaid  : 1985  MRN: [id number]    The patient Milena Gory exhibits the ability to control behavior in a less restrictive environment. Patient's level of functioning is improving. No assaultive/destructive behavior has been observed for the past 24 hours. No suicidal/homicidal threat or behavior has been observed for the past 24 hours. There is no evidence of serious medication side effects. Patient has not been in physical or protective restraints for at least the past 24 hours. If weapons involved, how are they secured? No weapons. Is patient aware of and in agreement with discharge plan? Yes     Arrangements for medication:  Medications filled here. Copy of discharge instructions to provider?:  Camilla Ware; [phone] Treatment     Arrangements for transportation home:  Mom to . Keep all follow up appointments as scheduled, continue to take prescribed medications per physician instructions.   Mental health crisis number:  [phone] or your local mental health crisis line number at [phone]

## 2020-10-29 NOTE — PROGRESS NOTES
Reached out to patient at [phone] for follow up. Voicemail came on and no message left due to confidentiality.

## 2022-03-19 PROBLEM — F19.94 SUBSTANCE INDUCED MOOD DISORDER (HCC): Status: ACTIVE | Noted: 2020-10-22

## 2024-03-11 ENCOUNTER — HOSPITAL ENCOUNTER (EMERGENCY)
Facility: HOSPITAL | Age: 39
Discharge: HOME | End: 2024-03-12
Attending: STUDENT IN AN ORGANIZED HEALTH CARE EDUCATION/TRAINING PROGRAM
Payer: COMMERCIAL

## 2024-03-11 DIAGNOSIS — F22 PARANOIA (HCC): Primary | ICD-10-CM

## 2024-03-11 LAB
ALBUMIN SERPL-MCNC: 4.4 G/DL (ref 3.5–5)
ALBUMIN/GLOB SERPL: 1.2 (ref 1.1–2.2)
ALP SERPL-CCNC: 108 U/L (ref 45–117)
ALT SERPL-CCNC: 28 U/L (ref 12–78)
AMPHET UR QL SCN: POSITIVE
ANION GAP SERPL CALC-SCNC: 7 MMOL/L (ref 5–15)
APAP SERPL-MCNC: <2 UG/ML (ref 10–30)
AST SERPL-CCNC: 15 U/L (ref 15–37)
BARBITURATES UR QL SCN: NEGATIVE
BASOPHILS # BLD: 0 K/UL (ref 0–0.1)
BASOPHILS NFR BLD: 1 % (ref 0–1)
BENZODIAZ UR QL: NEGATIVE
BILIRUB SERPL-MCNC: 0.6 MG/DL (ref 0.2–1)
BUN SERPL-MCNC: 11 MG/DL (ref 6–20)
BUN/CREAT SERPL: 8 (ref 12–20)
CALCIUM SERPL-MCNC: 10.1 MG/DL (ref 8.5–10.1)
CANNABINOIDS UR QL SCN: POSITIVE
CHLORIDE SERPL-SCNC: 108 MMOL/L (ref 97–108)
CO2 SERPL-SCNC: 24 MMOL/L (ref 21–32)
COCAINE UR QL SCN: NEGATIVE
COMMENT:: NORMAL
CREAT SERPL-MCNC: 1.33 MG/DL (ref 0.7–1.3)
DIFFERENTIAL METHOD BLD: ABNORMAL
EOSINOPHIL # BLD: 0.1 K/UL (ref 0–0.4)
EOSINOPHIL NFR BLD: 1 % (ref 0–7)
ERYTHROCYTE [DISTWIDTH] IN BLOOD BY AUTOMATED COUNT: 13.7 % (ref 11.5–14.5)
ETHANOL SERPL-MCNC: <10 MG/DL (ref 0–0.08)
GLOBULIN SER CALC-MCNC: 3.6 G/DL (ref 2–4)
GLUCOSE SERPL-MCNC: 80 MG/DL (ref 65–100)
HCT VFR BLD AUTO: 40.8 % (ref 36.6–50.3)
HGB BLD-MCNC: 13.8 G/DL (ref 12.1–17)
IMM GRANULOCYTES # BLD AUTO: 0 K/UL (ref 0–0.04)
IMM GRANULOCYTES NFR BLD AUTO: 0 % (ref 0–0.5)
LYMPHOCYTES # BLD: 2.5 K/UL (ref 0.8–3.5)
LYMPHOCYTES NFR BLD: 35 % (ref 12–49)
Lab: ABNORMAL
MCH RBC QN AUTO: 27 PG (ref 26–34)
MCHC RBC AUTO-ENTMCNC: 33.8 G/DL (ref 30–36.5)
MCV RBC AUTO: 79.8 FL (ref 80–99)
METHADONE UR QL: NEGATIVE
MONOCYTES # BLD: 0.5 K/UL (ref 0–1)
MONOCYTES NFR BLD: 6 % (ref 5–13)
NEUTS SEG # BLD: 4.1 K/UL (ref 1.8–8)
NEUTS SEG NFR BLD: 57 % (ref 32–75)
NRBC # BLD: 0 K/UL (ref 0–0.01)
NRBC BLD-RTO: 0 PER 100 WBC
OPIATES UR QL: NEGATIVE
PCP UR QL: NEGATIVE
PLATELET # BLD AUTO: 365 K/UL (ref 150–400)
PMV BLD AUTO: 9.2 FL (ref 8.9–12.9)
POTASSIUM SERPL-SCNC: 3.7 MMOL/L (ref 3.5–5.1)
PROT SERPL-MCNC: 8 G/DL (ref 6.4–8.2)
RBC # BLD AUTO: 5.11 M/UL (ref 4.1–5.7)
SALICYLATES SERPL-MCNC: 2.5 MG/DL (ref 2.8–20)
SODIUM SERPL-SCNC: 139 MMOL/L (ref 136–145)
SPECIMEN HOLD: NORMAL
WBC # BLD AUTO: 7.2 K/UL (ref 4.1–11.1)

## 2024-03-11 PROCEDURE — 99283 EMERGENCY DEPT VISIT LOW MDM: CPT

## 2024-03-11 PROCEDURE — 82077 ASSAY SPEC XCP UR&BREATH IA: CPT

## 2024-03-11 PROCEDURE — 90791 PSYCH DIAGNOSTIC EVALUATION: CPT

## 2024-03-11 PROCEDURE — 85025 COMPLETE CBC W/AUTO DIFF WBC: CPT

## 2024-03-11 PROCEDURE — 80143 DRUG ASSAY ACETAMINOPHEN: CPT

## 2024-03-11 PROCEDURE — 87635 SARS-COV-2 COVID-19 AMP PRB: CPT

## 2024-03-11 PROCEDURE — 80307 DRUG TEST PRSMV CHEM ANLYZR: CPT

## 2024-03-11 PROCEDURE — 6370000000 HC RX 637 (ALT 250 FOR IP): Performed by: STUDENT IN AN ORGANIZED HEALTH CARE EDUCATION/TRAINING PROGRAM

## 2024-03-11 PROCEDURE — 80179 DRUG ASSAY SALICYLATE: CPT

## 2024-03-11 PROCEDURE — 80053 COMPREHEN METABOLIC PANEL: CPT

## 2024-03-11 PROCEDURE — 36415 COLL VENOUS BLD VENIPUNCTURE: CPT

## 2024-03-11 RX ORDER — OLANZAPINE 5 MG/1
10 TABLET ORAL ONCE
Status: COMPLETED | OUTPATIENT
Start: 2024-03-11 | End: 2024-03-11

## 2024-03-11 RX ORDER — LORAZEPAM 1 MG/1
1 TABLET ORAL ONCE
Status: COMPLETED | OUTPATIENT
Start: 2024-03-11 | End: 2024-03-11

## 2024-03-11 RX ADMIN — OLANZAPINE 10 MG: 5 TABLET, FILM COATED ORAL at 19:29

## 2024-03-11 RX ADMIN — LORAZEPAM 1 MG: 1 TABLET ORAL at 16:54

## 2024-03-11 ASSESSMENT — ENCOUNTER SYMPTOMS
RHINORRHEA: 0
VOMITING: 0
EYE DISCHARGE: 0
DIARRHEA: 0
COUGH: 1
NAUSEA: 0
SHORTNESS OF BREATH: 0
EYE REDNESS: 0
ABDOMINAL PAIN: 0

## 2024-03-11 ASSESSMENT — PAIN - FUNCTIONAL ASSESSMENT: PAIN_FUNCTIONAL_ASSESSMENT: NONE - DENIES PAIN
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
## 2024-03-11 NOTE — ED PROVIDER NOTES
[facility]        PROCEDURES:  Unless otherwise noted below, none     Procedures      FINAL IMPRESSION      1. Paranoia (HCC)          DISPOSITION/PLAN   DISPOSITION Decision To Discharge 03/11/2024 10:43:16 PM      PATIENT REFERRED TO:  [facility] EMERGENCY DEPT  [address] [facility] [address]  [phone]    If symptoms worsen    [facility]  [address]  [place] [address] [phone]    Schedule an appointment as soon as possible for a visit         DISCHARGE MEDICATIONS:  New Prescriptions    No medications on file         (Please note that portions of this note were completed with a voice recognition program.  Efforts were made to edit the dictations but occasionally words are mis-transcribed.)    Brooke Alonso DO (electronically signed)  Emergency Attending Physician / Physician Assistant / Nurse Practitioner         Brooke Alonso DO  03/11/24 9612
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
## 2024-03-11 NOTE — PROGRESS NOTES
BSMART assessment completed, and suicide risk level noted to be none. Primary Nurse Nicole and Physician Dr Salazar notified. Concerns not observed.  Security/Off- has not been notified.

## 2024-03-12 VITALS
HEIGHT: 72 IN | WEIGHT: 175 LBS | BODY MASS INDEX: 23.7 KG/M2 | RESPIRATION RATE: 18 BRPM | DIASTOLIC BLOOD PRESSURE: 96 MMHG | SYSTOLIC BLOOD PRESSURE: 147 MMHG | HEART RATE: 77 BPM | OXYGEN SATURATION: 100 % | TEMPERATURE: 98 F
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
## 2024-03-12 NOTE — BSMART NOTE
BSMART assessment completed, and suicide risk level noted to be no risk. Primary Nurse  and Charge Nurse Margi and Physician Gavin notified. Concerns not observed.

## 2024-03-12 NOTE — DISCHARGE INSTRUCTIONS
Please do not use amphetamines as this is likely worsening your paranoia.  Take your mental health medications as prescribed.  Follow-up with outpatient resources as soon as possible.

## 2024-03-13 LAB
SARS-COV-2 RNA RESP QL NAA+PROBE: NOT DETECTED
SOURCE: NORMAL

## 2024-03-14 VITALS
DIASTOLIC BLOOD PRESSURE: 80 MMHG | HEART RATE: 95 BPM | OXYGEN SATURATION: 99 % | RESPIRATION RATE: 16 BRPM | SYSTOLIC BLOOD PRESSURE: 144 MMHG | BODY MASS INDEX: 23.7 KG/M2 | HEIGHT: 72 IN | TEMPERATURE: 97.9 F | WEIGHT: 175 LBS

## 2024-03-14 PROCEDURE — 99283 EMERGENCY DEPT VISIT LOW MDM: CPT

## 2024-03-14 ASSESSMENT — PAIN SCALES - GENERAL: PAINLEVEL_OUTOF10: 0

## 2024-03-14 ASSESSMENT — PAIN - FUNCTIONAL ASSESSMENT: PAIN_FUNCTIONAL_ASSESSMENT: NONE - DENIES PAIN

## 2024-03-15 ENCOUNTER — HOSPITAL ENCOUNTER (EMERGENCY)
Facility: HOSPITAL | Age: 39
Discharge: HOME | End: 2024-03-15
Attending: STUDENT IN AN ORGANIZED HEALTH CARE EDUCATION/TRAINING PROGRAM
Payer: COMMERCIAL

## 2024-03-15 DIAGNOSIS — F19.959 SUBSTANCE-INDUCED PSYCHOTIC DISORDER (HCC): Primary | ICD-10-CM

## 2024-03-15 PROCEDURE — 6370000000 HC RX 637 (ALT 250 FOR IP): Performed by: STUDENT IN AN ORGANIZED HEALTH CARE EDUCATION/TRAINING PROGRAM

## 2024-03-15 RX ORDER — OLANZAPINE 5 MG/1
5 TABLET ORAL NIGHTLY
Qty: 30 TABLET | Refills: 3 | Status: SHIPPED | OUTPATIENT
Start: 2024-03-15

## 2024-03-15 RX ORDER — HYDROXYZINE HYDROCHLORIDE 25 MG/1
25 TABLET, FILM COATED ORAL ONCE
Status: COMPLETED | OUTPATIENT
Start: 2024-03-15 | End: 2024-03-15

## 2024-03-15 RX ORDER — HYDROXYZINE HYDROCHLORIDE 25 MG/1
25 TABLET, FILM COATED ORAL EVERY 8 HOURS PRN
Qty: 30 TABLET | Refills: 0 | Status: SHIPPED | OUTPATIENT
Start: 2024-03-15 | End: 2024-03-25

## 2024-03-15 RX ADMIN — HYDROXYZINE HYDROCHLORIDE 25 MG: 25 TABLET, FILM COATED ORAL at 01:49

## 2024-03-15 ASSESSMENT — ENCOUNTER SYMPTOMS
COUGH: 0
RHINORRHEA: 0
EYE REDNESS: 0
VOMITING: 0
EYE DISCHARGE: 0
DIARRHEA: 0
NAUSEA: 0

## 2024-03-15 NOTE — ED PROVIDER NOTES
[facility] EMERGENCY DEPT  EMERGENCY DEPARTMENT ENCOUNTER      Pt Name: Nicholas Hood  MRN: [id number]  Birthdate 1985  Date of evaluation: 3/14/2024  Provider: Brooke Alonso DO    CHIEF COMPLAINT       Chief Complaint   Patient presents with    Hallucinations         HISTORY OF PRESENT ILLNESS    HPI    Nicholas Hood is a 38 y.o. male with history of substance induced psychosis who presents to the emergency department for reported hallucinations. Describes hallucinations as seeing \"weird cars and vehicles\"  and hearing \"dogs bark that no one else does.\" Patient was seen in the ED on 3/11 for paranoia (I personally evaluated the patient). He was assessed  by [facility] and felt his paranoia was likely due to methamphetamine use and did not meet criteria for behavioral health placement. At that time he had blister pack of medications including olanzapine, mirtazapine and hydroxyzine. He had an empty pack for fluoxetine. Patient is not taking his medications at this time. He denies any SI or HI.      Nursing Notes were reviewed.    REVIEW OF SYSTEMS       Review of Systems   Constitutional:  Negative for chills and fever.   HENT:  Negative for congestion and rhinorrhea.    Eyes:  Negative for discharge and redness.   Respiratory:  Negative for cough.    Gastrointestinal:  Negative for diarrhea, nausea and vomiting.   Neurological:  Negative for speech difficulty.   Psychiatric/Behavioral:  Positive for hallucinations. Negative for agitation and suicidal ideas. The patient is nervous/anxious.            PAST MEDICAL HISTORY     Past Medical History:   Diagnosis Date    Substance abuse (HCC)          SURGICAL HISTORY     No past surgical history on file.      CURRENT MEDICATIONS       Discharge Medication List as of 3/15/2024  2:01 AM          ALLERGIES     Patient has no known allergies.    FAMILY HISTORY     No family history on file.       SOCIAL HISTORY       Social History     Socioeconomic History     appointment.  All questions have been answered and patient and/or available family express understanding.      Risk  Prescription drug management.        CONSULTS:  None      PROCEDURES:  Unless otherwise noted below, none     Procedures      FINAL IMPRESSION      1. Substance-induced psychotic disorder (HCC)          DISPOSITION/PLAN   DISPOSITION Decision To Discharge 03/15/2024 01:59:57 AM      PATIENT REFERRED TO:  [facility] EMERGENCY DEPT  [address] [address]  [phone]    If symptoms worsen      DISCHARGE MEDICATIONS:  Discharge Medication List as of 3/15/2024  2:01 AM        START taking these medications    Details   OLANZapine (ZYPREXA) 5 MG tablet Take 1 tablet by mouth nightly, Disp-30 tablet, R-3Print      hydrOXYzine HCl (ATARAX) 25 MG tablet Take 1 tablet by mouth every 8 hours as needed for Itching, Disp-30 tablet, R-0Print               (Please note that portions of this note were completed with a voice recognition program.  Efforts were made to edit the dictations but occasionally words are mis-transcribed.)    Brooke Alonso DO (electronically signed)  Emergency Attending Physician / Physician Assistant / Nurse Practitioner         Brooke Alonso DO  03/15/24 0429
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
